# Patient Record
Sex: FEMALE | Race: BLACK OR AFRICAN AMERICAN | Employment: PART TIME | URBAN - METROPOLITAN AREA
[De-identification: names, ages, dates, MRNs, and addresses within clinical notes are randomized per-mention and may not be internally consistent; named-entity substitution may affect disease eponyms.]

---

## 2017-02-12 ENCOUNTER — HOSPITAL ENCOUNTER (EMERGENCY)
Age: 62
Discharge: HOME | End: 2017-02-12
Attending: EMERGENCY MEDICINE
Payer: COMMERCIAL

## 2017-02-12 VITALS
WEIGHT: 233.8 LBS | OXYGEN SATURATION: 100 % | HEART RATE: 89 BPM | TEMPERATURE: 98.4 F | RESPIRATION RATE: 20 BRPM | SYSTOLIC BLOOD PRESSURE: 171 MMHG | BODY MASS INDEX: 34.63 KG/M2 | DIASTOLIC BLOOD PRESSURE: 82 MMHG | HEIGHT: 69 IN

## 2017-02-12 DIAGNOSIS — R10.84 GENERALIZED ABDOMINAL CRAMPS: Primary | ICD-10-CM

## 2017-02-12 PROCEDURE — 74011000250 HC RX REV CODE- 250: Performed by: EMERGENCY MEDICINE

## 2017-02-12 PROCEDURE — 74011250637 HC RX REV CODE- 250/637: Performed by: EMERGENCY MEDICINE

## 2017-02-12 PROCEDURE — 99283 EMERGENCY DEPT VISIT LOW MDM: CPT

## 2017-02-12 RX ADMIN — PHENOBARBITAL ELIXIR 50 ML: 16.2; .1037; .0065; .0194 ELIXIR ORAL at 20:26

## 2017-02-12 NOTE — LETTER
[facility] EMERGENCY DEPT 
[address] [place] [address] 
[phone] Work/School Note Date: 2/12/2017 To Whom It May concern: 
 
Jennie Silva was seen and treated today in the emergency room by the following provider(s): 
Attending Provider: Juan Garcia MD.   
 
Jennie Silva may return to work on 2/13/2017.  
 
Sincerely, 
 
 
 
Juan Garcia MD

## 2017-02-13 NOTE — ED NOTES
Pt presents ambulatory to ED complaining of diffuse abdominal pain x1 hr PTA. Pt denies taking medications for relief of symptoms. Pt denies urinary symptoms, denies N,V,D,  Denies vaginal discharge. Pt also requesting work note during assessment. Pt is alert and oriented x 4, RR even and unlabored, skin is warm and dry. Assesment completed and pt updated on plan of care.

## 2017-02-13 NOTE — DISCHARGE INSTRUCTIONS

## 2017-02-13 NOTE — ED PROVIDER NOTES
HPI Comments: Duran Walter, 63 y.o. Female with PMHx of HTN presents ambulatory to [facility] ED with cc of intermittent lower abdominal cramping and aching x 1 hour. Pt thinks it may be related to something she ate earlier today and reports eating a bowl of fruit today. Her last BM was 1 day ago and she denies a hx of constipation. Pt has had a partial hysterectomy and reports infrequent hot flashes. She has not taken any OTC medications prior to arrival and is requesting a work note for today. She specifically denies any fevers, chills, nausea, vomiting, chest pain, shortness of breath, headache, rash, diarrhea, dysuria, frequency, difficulty urinating, urgency, vaginal discharge, sweating or weight loss. PCP: Bob Roche MD    Social history significant for: - Tobacco (former), + EtOH, - Illicit Drug Use  PSHx: partial hysterectomy. There are no other complaints, changes, or physical findings at this time. Written by PAUL Milneribjess, as dictated by Shaun Kaur MD.      The history is provided by the patient. No  was used. Past Medical History:   Diagnosis Date    Hypercholesterolemia     Hypertension        Past Surgical History:   Procedure Laterality Date    Hx gyn       Hysterectomy         Family History:   Problem Relation Age of Onset    Asthma Mother     Elevated Lipids Sister     Hypertension Sister        Social History     Social History    Marital status: SINGLE     Spouse name: N/A    Number of children: N/A    Years of education: N/A     Occupational History    Not on file.      Social History Main Topics    Smoking status: Former Smoker     Years: 1.00    Smokeless tobacco: Never Used    Alcohol use Yes    Drug use: No    Sexual activity: Yes     Partners: Male     Birth control/ protection: Condom     Other Topics Concern    Not on file     Social History Narrative         ALLERGIES: Tylenol [acetaminophen]    Review of Systems Constitutional: Negative. Negative for chills, diaphoresis and fatigue. (-) malaise   HENT: Negative for sore throat and trouble swallowing. Eyes: Negative. Respiratory: Negative for cough and shortness of breath. (-) COMBS   Cardiovascular: Negative for chest pain. Gastrointestinal: Positive for abdominal pain (lower). Negative for constipation, diarrhea, nausea and vomiting. Endocrine: Negative. Genitourinary: Negative. Negative for difficulty urinating, dysuria, frequency, urgency and vaginal discharge. Musculoskeletal: Negative for arthralgias, back pain and myalgias. Skin: Negative. Allergic/Immunologic: Negative. Neurological: Negative for weakness. Hematological: Does not bruise/bleed easily. All other systems reviewed and are negative. Vitals:    02/12/17 1958   BP: 171/82   Pulse: 89   Resp: 20   Temp: 98.4 °F (36.9 °C)   SpO2: 100%   Weight: 106.1 kg (233 lb 12.8 oz)   Height: 5' 9\" (1.753 m)            Physical Exam   Constitutional: She is oriented to person, place, and time. She appears well-developed and well-nourished. No distress. HENT:   Head: Normocephalic and atraumatic. Mouth/Throat: Oropharynx is clear and moist. No oropharyngeal exudate or posterior oropharyngeal erythema. Neck: Normal range of motion and full passive range of motion without pain. Neck supple. Cardiovascular: Normal rate, regular rhythm, normal heart sounds, intact distal pulses and normal pulses. Exam reveals no gallop and no friction rub. No murmur heard. Pulmonary/Chest: Effort normal and breath sounds normal. No accessory muscle usage. No respiratory distress. She has no decreased breath sounds. She has no wheezes. She has no rhonchi. She has no rales. Abdominal: Soft. Bowel sounds are normal. She exhibits no distension. There is no tenderness. There is no rebound, no guarding and no CVA tenderness. Musculoskeletal: Normal range of motion.  She exhibits no edema or tenderness. Thoracic back: She exhibits no tenderness and no bony tenderness. Lumbar back: She exhibits no tenderness and no bony tenderness. Lymphadenopathy:     She has no cervical adenopathy. Neurological: She is alert and oriented to person, place, and time. She has normal strength. She is not disoriented. No cranial nerve deficit or sensory deficit. No focal deficits; 5/5 muscle strength in all extremities   Skin: Skin is warm. No lesion and no rash noted. Rash is not nodular. She is not diaphoretic. Nursing note and vitals reviewed. MDM  Number of Diagnoses or Management Options  Generalized abdominal cramps:   Diagnosis management comments: DDx: functional abdominal cramps, IBS, constipation, functional abdominal pain        Amount and/or Complexity of Data Reviewed  Review and summarize past medical records: yes    Patient Progress  Patient progress: stable    ED Course       Procedures    MEDICATIONS GIVEN:  Medications   mylanta/donnatal/viscous lidocaine (GI COCKTAIL) (not administered)       IMPRESSION:  1. Generalized abdominal cramps        PLAN:  1. Current Discharge Medication List      CONTINUE these medications which have NOT CHANGED    Details   lisinopril-hydroCHLOROthiazide (PRINZIDE, ZESTORETIC) 20-25 mg per tablet Take 1 Tab by mouth daily. Qty: 90 Tab, Refills: 3      rosuvastatin (CRESTOR) 20 mg tablet Take 1 Tab by mouth nightly. Qty: 90 Tab, Refills: 3           2. Follow-up Information     Follow up With Details Comments Zhang Padilla MD   [address]  [phone]          Return to ED if worse       Discharge Note:  8:11 PM  The pt is ready for discharge. The pt's signs, symptoms, diagnosis, and discharge instructions have been discussed and pt has conveyed their understanding. The pt is to follow up as recommended or return to ER should their symptoms worsen.  Plan has been discussed and pt is in agreement. This note is prepared by Maverick Silva, acting as a Scribe for Niya Delvalle MD.    Niya Delvalle MD: The scribe's documentation has been prepared under my direction and personally reviewed by me in its entirety. I confirm that the notes above accurately reflects all work, treatment, procedures, and medical decision making performed by me.

## 2017-02-13 NOTE — ED NOTES
Discharge instructions were given to the patient by Blanca Silva RN. The patient left the Emergency Department ambulatory, alert and oriented and in no acute distress with 0 prescriptions and a note. The patient was encouraged to call or return to the ED for worsening issues or problems and was encouraged to schedule a follow up appointment for continuing care. The patient verbalized understanding of discharge instructions and prescriptions, all questions were answered. The patient has no further concerns at this time.

## 2017-03-07 NOTE — TELEPHONE ENCOUNTER
Patient states she needs a call back in reference to her Lisinopril prescribed & getting headaches within 30 mins of taking medication & side effects. Patient states she would like to discuss alternatives. Please call.  Thank you

## 2017-03-07 NOTE — TELEPHONE ENCOUNTER
Call completed, two patient identifiers verified. Patient reports experiencing headaches after taking lisinopril. Patient dc'd medication and the headaches stopped. She reports previously taking Hctz with NO side effects.

## 2017-03-08 NOTE — TELEPHONE ENCOUNTER
Patient states she needs a call back today in reference to previous encounter & discussion with nurse yesterday on her medication & needs to be advise further what Dr. Gayl Sicard of care is. Patient states a detailed message can be left on voice mail of attached phone number. Please call to advise.  Thank you

## 2017-03-09 RX ORDER — HYDROCHLOROTHIAZIDE 12.5 MG/1
12.5 TABLET ORAL DAILY
Qty: 90 TAB | Refills: 0 | Status: SHIPPED | OUTPATIENT
Start: 2017-03-09 | End: 2017-08-17

## 2017-03-09 RX ORDER — LOSARTAN POTASSIUM 25 MG/1
25 TABLET ORAL DAILY
Qty: 30 TAB | Refills: 2 | Status: SHIPPED | OUTPATIENT
Start: 2017-03-09 | End: 2017-12-01

## 2017-03-09 NOTE — TELEPHONE ENCOUNTER
Call completed to patient, two identifiers verified. Patient advised that Rx's are available at her pharmacy.

## 2017-03-09 NOTE — TELEPHONE ENCOUNTER
She can continue to take hctz for now. I will add losartan to replace lisinopril. Continue to check blood pressures. Bp check with nurse in two weeks. Will need an earlier f/u if cont. problems with medication.

## 2017-03-09 NOTE — TELEPHONE ENCOUNTER
Patient states she needs a call back today to be advised 's Plan of Care from Previous encounters for her medication problem & possible change of medication. Please call to advise.  Thank you

## 2017-07-15 ENCOUNTER — HOSPITAL ENCOUNTER (EMERGENCY)
Age: 62
Discharge: STILL ADMITTED OR ADMINISTRATIVE | End: 2017-07-15
Attending: FAMILY MEDICINE

## 2017-07-15 DIAGNOSIS — Z53.21 PATIENT LEFT BEFORE EVALUATION BY PHYSICIAN: Primary | ICD-10-CM

## 2017-07-16 NOTE — UC PROVIDER NOTE
HPI     Past Medical History:   Diagnosis Date    Hypercholesterolemia     Hypertension         Past Surgical History:   Procedure Laterality Date    HX GYN      Hysterectomy         Family History   Problem Relation Age of Onset    Asthma Mother     Elevated Lipids Sister     Hypertension Sister         Social History     Social History    Marital status: SINGLE     Spouse name: N/A    Number of children: N/A    Years of education: N/A     Occupational History    Not on file. Social History Main Topics    Smoking status: Former Smoker     Years: 1.00    Smokeless tobacco: Never Used    Alcohol use Yes    Drug use: No    Sexual activity: Yes     Partners: Male     Birth control/ protection: Condom     Other Topics Concern    Not on file     Social History Narrative                ALLERGIES: Tylenol [acetaminophen]    Review of Systems    There were no vitals filed for this visit.     Physical Exam    MDM     Differential Diagnosis; Clinical Impression; Plan:     patient not seen      Procedures

## 2017-08-17 ENCOUNTER — APPOINTMENT (OUTPATIENT)
Dept: GENERAL RADIOLOGY | Age: 62
End: 2017-08-17
Attending: PHYSICIAN ASSISTANT
Payer: COMMERCIAL

## 2017-08-17 ENCOUNTER — HOSPITAL ENCOUNTER (EMERGENCY)
Age: 62
Discharge: HOME | End: 2017-08-17
Attending: EMERGENCY MEDICINE
Payer: COMMERCIAL

## 2017-08-17 ENCOUNTER — APPOINTMENT (OUTPATIENT)
Dept: CT IMAGING | Age: 62
End: 2017-08-17
Attending: PHYSICIAN ASSISTANT
Payer: COMMERCIAL

## 2017-08-17 VITALS
TEMPERATURE: 98.4 F | SYSTOLIC BLOOD PRESSURE: 160 MMHG | RESPIRATION RATE: 16 BRPM | DIASTOLIC BLOOD PRESSURE: 90 MMHG | OXYGEN SATURATION: 98 % | BODY MASS INDEX: 31.7 KG/M2 | WEIGHT: 214 LBS | HEIGHT: 69 IN | HEART RATE: 89 BPM

## 2017-08-17 DIAGNOSIS — R42 VERTIGO: Primary | ICD-10-CM

## 2017-08-17 LAB
AMPHET UR QL SCN: NEGATIVE
ANION GAP SERPL CALC-SCNC: 9 MMOL/L (ref 5–15)
BARBITURATES UR QL SCN: NEGATIVE
BASOPHILS # BLD: 0 K/UL (ref 0–0.1)
BASOPHILS NFR BLD: 0 % (ref 0–1)
BENZODIAZ UR QL: NEGATIVE
BNP SERPL-MCNC: 46 PG/ML (ref 0–125)
BUN SERPL-MCNC: 7 MG/DL (ref 6–20)
BUN/CREAT SERPL: 9 (ref 12–20)
CALCIUM SERPL-MCNC: 9.9 MG/DL (ref 8.5–10.1)
CANNABINOIDS UR QL SCN: NEGATIVE
CHLORIDE SERPL-SCNC: 104 MMOL/L (ref 97–108)
CK MB CFR SERPL CALC: 0.8 % (ref 0–2.5)
CK MB SERPL-MCNC: 1.8 NG/ML (ref 5–25)
CK SERPL-CCNC: 227 U/L (ref 26–192)
CO2 SERPL-SCNC: 28 MMOL/L (ref 21–32)
COCAINE UR QL SCN: NEGATIVE
CREAT SERPL-MCNC: 0.82 MG/DL (ref 0.55–1.02)
DRUG SCRN COMMENT,DRGCM: NORMAL
EOSINOPHIL # BLD: 0 K/UL (ref 0–0.4)
EOSINOPHIL NFR BLD: 1 % (ref 0–7)
ERYTHROCYTE [DISTWIDTH] IN BLOOD BY AUTOMATED COUNT: 14.7 % (ref 11.5–14.5)
GLUCOSE BLD STRIP.AUTO-MCNC: 119 MG/DL (ref 65–100)
GLUCOSE SERPL-MCNC: 116 MG/DL (ref 65–100)
HCT VFR BLD AUTO: 43.4 % (ref 35–47)
HGB BLD-MCNC: 14.7 G/DL (ref 11.5–16)
LYMPHOCYTES # BLD: 1.9 K/UL (ref 0.8–3.5)
LYMPHOCYTES NFR BLD: 41 % (ref 12–49)
MCH RBC QN AUTO: 30.2 PG (ref 26–34)
MCHC RBC AUTO-ENTMCNC: 33.9 G/DL (ref 30–36.5)
MCV RBC AUTO: 89.3 FL (ref 80–99)
METHADONE UR QL: NEGATIVE
MONOCYTES # BLD: 0.3 K/UL (ref 0–1)
MONOCYTES NFR BLD: 6 % (ref 5–13)
NEUTS SEG # BLD: 2.5 K/UL (ref 1.8–8)
NEUTS SEG NFR BLD: 52 % (ref 32–75)
OPIATES UR QL: NEGATIVE
PCP UR QL: NEGATIVE
PLATELET # BLD AUTO: 169 K/UL (ref 150–400)
POTASSIUM SERPL-SCNC: 4.2 MMOL/L (ref 3.5–5.1)
RBC # BLD AUTO: 4.86 M/UL (ref 3.8–5.2)
SERVICE CMNT-IMP: ABNORMAL
SODIUM SERPL-SCNC: 141 MMOL/L (ref 136–145)
TROPONIN I SERPL-MCNC: <0.04 NG/ML
WBC # BLD AUTO: 4.8 K/UL (ref 3.6–11)

## 2017-08-17 PROCEDURE — 85025 COMPLETE CBC W/AUTO DIFF WBC: CPT | Performed by: PHYSICIAN ASSISTANT

## 2017-08-17 PROCEDURE — 71020 XR CHEST PA LAT: CPT

## 2017-08-17 PROCEDURE — 74011250637 HC RX REV CODE- 250/637: Performed by: PHYSICIAN ASSISTANT

## 2017-08-17 PROCEDURE — 99284 EMERGENCY DEPT VISIT MOD MDM: CPT

## 2017-08-17 PROCEDURE — 93005 ELECTROCARDIOGRAM TRACING: CPT

## 2017-08-17 PROCEDURE — 84484 ASSAY OF TROPONIN QUANT: CPT | Performed by: PHYSICIAN ASSISTANT

## 2017-08-17 PROCEDURE — 36415 COLL VENOUS BLD VENIPUNCTURE: CPT | Performed by: PHYSICIAN ASSISTANT

## 2017-08-17 PROCEDURE — 83880 ASSAY OF NATRIURETIC PEPTIDE: CPT | Performed by: PHYSICIAN ASSISTANT

## 2017-08-17 PROCEDURE — 70450 CT HEAD/BRAIN W/O DYE: CPT

## 2017-08-17 PROCEDURE — 82550 ASSAY OF CK (CPK): CPT | Performed by: PHYSICIAN ASSISTANT

## 2017-08-17 PROCEDURE — 96374 THER/PROPH/DIAG INJ IV PUSH: CPT

## 2017-08-17 PROCEDURE — 80307 DRUG TEST PRSMV CHEM ANLYZR: CPT | Performed by: PHYSICIAN ASSISTANT

## 2017-08-17 PROCEDURE — 80048 BASIC METABOLIC PNL TOTAL CA: CPT | Performed by: PHYSICIAN ASSISTANT

## 2017-08-17 PROCEDURE — 74011250636 HC RX REV CODE- 250/636: Performed by: PHYSICIAN ASSISTANT

## 2017-08-17 PROCEDURE — 82962 GLUCOSE BLOOD TEST: CPT

## 2017-08-17 RX ORDER — MECLIZINE HYDROCHLORIDE 25 MG/1
25 TABLET ORAL
Qty: 20 TAB | Refills: 0 | Status: SHIPPED | OUTPATIENT
Start: 2017-08-17

## 2017-08-17 RX ORDER — ONDANSETRON 4 MG/1
4 TABLET, ORALLY DISINTEGRATING ORAL
Status: COMPLETED | OUTPATIENT
Start: 2017-08-17 | End: 2017-08-17

## 2017-08-17 RX ORDER — SODIUM CHLORIDE 0.9 % (FLUSH) 0.9 %
5-10 SYRINGE (ML) INJECTION AS NEEDED
Status: DISCONTINUED | OUTPATIENT
Start: 2017-08-17 | End: 2017-08-17 | Stop reason: HOSPADM

## 2017-08-17 RX ORDER — KETOROLAC TROMETHAMINE 30 MG/ML
30 INJECTION, SOLUTION INTRAMUSCULAR; INTRAVENOUS
Status: COMPLETED | OUTPATIENT
Start: 2017-08-17 | End: 2017-08-17

## 2017-08-17 RX ORDER — HYDROCHLOROTHIAZIDE 12.5 MG/1
12.5 TABLET ORAL DAILY
Qty: 10 TAB | Refills: 0 | Status: SHIPPED | OUTPATIENT
Start: 2017-08-17 | End: 2017-12-01

## 2017-08-17 RX ORDER — SODIUM CHLORIDE 0.9 % (FLUSH) 0.9 %
5-10 SYRINGE (ML) INJECTION EVERY 8 HOURS
Status: DISCONTINUED | OUTPATIENT
Start: 2017-08-17 | End: 2017-08-17 | Stop reason: HOSPADM

## 2017-08-17 RX ADMIN — KETOROLAC TROMETHAMINE 30 MG: 30 INJECTION, SOLUTION INTRAMUSCULAR at 13:14

## 2017-08-17 RX ADMIN — ONDANSETRON 4 MG: 4 TABLET, ORALLY DISINTEGRATING ORAL at 11:16

## 2017-08-17 NOTE — ED PROVIDER NOTES
HPI Comments: Pt presents with PMHx of htn and hypercholesterolemia. Pt reports dizziness with assoc frontal headache, N/V that started this am. Denies chest pain, SOB, blurred vision, head trauma/injury. Pt denies cardiac hx. Denies hx migraine headaches. Denies weakness, numbness/tingling, facial droop, slurred speech, gait abnormality, abd pain, change in BM, urinary sx. Patient is a 63 y.o. female presenting with dizziness. The history is provided by the patient. Dizziness   This is a new problem. Episode onset: x 1 morning. There was no focality noted. There has been no fever. Associated symptoms include vomiting, headaches and nausea. Pertinent negatives include no shortness of breath, no chest pain, no confusion, no choking, no bowel incontinence and no bladder incontinence. Past Medical History:   Diagnosis Date    Hypercholesterolemia     Hypertension        Past Surgical History:   Procedure Laterality Date    HX GYN      Hysterectomy         Family History:   Problem Relation Age of Onset    Asthma Mother     Elevated Lipids Sister     Hypertension Sister        Social History     Social History    Marital status: SINGLE     Spouse name: N/A    Number of children: N/A    Years of education: N/A     Occupational History    Not on file. Social History Main Topics    Smoking status: Former Smoker     Years: 1.00    Smokeless tobacco: Never Used    Alcohol use Yes    Drug use: No    Sexual activity: Yes     Partners: Male     Birth control/ protection: Condom     Other Topics Concern    Not on file     Social History Narrative         ALLERGIES: Tylenol [acetaminophen]    Review of Systems   Constitutional: Negative for chills and fever. HENT: Negative for facial swelling and trouble swallowing. Eyes: Negative for photophobia and visual disturbance. Respiratory: Negative for choking and shortness of breath. Cardiovascular: Negative for chest pain.    Gastrointestinal: Positive for nausea and vomiting. Negative for abdominal distention, abdominal pain, bowel incontinence, constipation and diarrhea. Genitourinary: Negative for bladder incontinence and flank pain. Musculoskeletal: Negative for back pain and myalgias. Skin: Negative for color change, pallor, rash and wound. Neurological: Positive for dizziness and headaches. Negative for syncope, facial asymmetry, weakness, light-headedness and numbness. Psychiatric/Behavioral: Negative for confusion. All other systems reviewed and are negative. Vitals:    08/17/17 1044   BP: 160/90   Pulse: 89   Resp: 16   Temp: 98.4 °F (36.9 °C)   SpO2: 98%   Weight: 97.1 kg (214 lb)   Height: 5' 9\" (1.753 m)            Physical Exam   Constitutional: She is oriented to person, place, and time. She appears well-developed and well-nourished. No distress. HENT:   Head: Normocephalic and atraumatic. Eyes: Conjunctivae are normal.   Cardiovascular: Normal rate, regular rhythm and normal heart sounds. Pulmonary/Chest: Effort normal and breath sounds normal. No respiratory distress. She has no wheezes. She has no rales. Abdominal: Soft. Bowel sounds are normal. She exhibits no distension. There is no tenderness. Musculoskeletal: Normal range of motion. Neurological: She is alert and oriented to person, place, and time. She has normal strength. No cranial nerve deficit. She displays a negative Romberg sign. GCS eye subscore is 4. GCS verbal subscore is 5. GCS motor subscore is 6. No facial droop  No focal weakness  Strength 5/5 and equal in all extremities b/l  Sensation intact  No gait abnormality  Speech clear   Skin: Skin is warm. No rash noted. No erythema. Psychiatric: She has a normal mood and affect. Her behavior is normal.   Nursing note and vitals reviewed.        MDM  Number of Diagnoses or Management Options  Vertigo:   Diagnosis management comments: DDx: Angina, MI, Stroke, TIA, CHF, Pneumonia, Headache    ED Course       Procedures           ED EKG interpretation:  Rhythm: normal sinus rhythm; and regular . Rate (approx.): 78; Axis: normal; P wave: normal; QRS interval: normal ; ST/T wave: T wave inverted; in  Lead: V3, V4, V5 and V6; Other findings: abnormal ekg. This EKG was interpreted by SCOTTY Mcqueen,ED Provider.             LABORATORY TESTS:  Recent Results (from the past 12 hour(s))   GLUCOSE, POC    Collection Time: 08/17/17 10:49 AM   Result Value Ref Range    Glucose (POC) 119 (H) 65 - 100 mg/dL    Performed by Luci Frost    EKG, 12 LEAD, INITIAL    Collection Time: 08/17/17 10:52 AM   Result Value Ref Range    Ventricular Rate 78 BPM    Atrial Rate 78 BPM    P-R Interval 152 ms    QRS Duration 76 ms    Q-T Interval 374 ms    QTC Calculation (Bezet) 426 ms    Calculated P Axis 70 degrees    Calculated R Axis 24 degrees    Diagnosis       Normal sinus rhythm  T wave abnormality, consider anterior ischemia  Abnormal ECG  When compared with ECG of 24-OCT-2016 16:29,  T wave inversion now evident in Anterior leads     CK W/ CKMB & INDEX    Collection Time: 08/17/17 11:15 AM   Result Value Ref Range     (H) 26 - 192 U/L    CK - MB 1.8 <3.6 NG/ML    CK-MB Index 0.8 0 - 2.5     TROPONIN I    Collection Time: 08/17/17 11:15 AM   Result Value Ref Range    Troponin-I, Qt. <0.04 <0.05 ng/mL   DRUG SCREEN, URINE    Collection Time: 08/17/17 11:15 AM   Result Value Ref Range    AMPHETAMINES NEGATIVE  NEG      BARBITURATES NEGATIVE  NEG      BENZODIAZEPINE NEGATIVE  NEG      COCAINE NEGATIVE  NEG      METHADONE NEGATIVE  NEG      OPIATES NEGATIVE  NEG      PCP(PHENCYCLIDINE) NEGATIVE  NEG      THC (TH-CANNABINOL) NEGATIVE  NEG      Drug screen comment (NOTE)    CBC WITH AUTOMATED DIFF    Collection Time: 08/17/17 11:15 AM   Result Value Ref Range    WBC 4.8 3.6 - 11.0 K/uL    RBC 4.86 3.80 - 5.20 M/uL    HGB 14.7 11.5 - 16.0 g/dL    HCT 43.4 35.0 - 47.0 %    MCV 89.3 80.0 - 99.0 FL    MCH 30.2 26.0 - 34.0 PG MCHC 33.9 30.0 - 36.5 g/dL    RDW 14.7 (H) 11.5 - 14.5 %    PLATELET 637 958 - 203 K/uL    NEUTROPHILS 52 32 - 75 %    LYMPHOCYTES 41 12 - 49 %    MONOCYTES 6 5 - 13 %    EOSINOPHILS 1 0 - 7 %    BASOPHILS 0 0 - 1 %    ABS. NEUTROPHILS 2.5 1.8 - 8.0 K/UL    ABS. LYMPHOCYTES 1.9 0.8 - 3.5 K/UL    ABS. MONOCYTES 0.3 0.0 - 1.0 K/UL    ABS. EOSINOPHILS 0.0 0.0 - 0.4 K/UL    ABS. BASOPHILS 0.0 0.0 - 0.1 K/UL   NT-PRO BNP    Collection Time: 08/17/17 11:15 AM   Result Value Ref Range    NT pro-BNP 46 0 - 155 PG/ML   METABOLIC PANEL, BASIC    Collection Time: 08/17/17 11:15 AM   Result Value Ref Range    Sodium 141 136 - 145 mmol/L    Potassium 4.2 3.5 - 5.1 mmol/L    Chloride 104 97 - 108 mmol/L    CO2 28 21 - 32 mmol/L    Anion gap 9 5 - 15 mmol/L    Glucose 116 (H) 65 - 100 mg/dL    BUN 7 6 - 20 MG/DL    Creatinine 0.82 0.55 - 1.02 MG/DL    BUN/Creatinine ratio 9 (L) 12 - 20      GFR est AA >60 >60 ml/min/1.73m2    GFR est non-AA >60 >60 ml/min/1.73m2    Calcium 9.9 8.5 - 10.1 MG/DL       IMAGING RESULTS:  CT HEAD WO CONT   Final Result      XR CHEST PA LAT   Final Result          MEDICATIONS GIVEN:  Medications   ondansetron (ZOFRAN ODT) tablet 4 mg (4 mg Oral Given 8/17/17 1116)   ketorolac (TORADOL) injection 30 mg (30 mg IntraVENous Given 8/17/17 1314)       IMPRESSION:  1. Vertigo        PLAN:  1. Discharge Medication List as of 8/17/2017  1:06 PM      START taking these medications    Details   meclizine (ANTIVERT) 25 mg tablet Take 1 Tab by mouth every six (6) hours as needed., Normal, Disp-20 Tab, R-0         CONTINUE these medications which have CHANGED    Details   hydroCHLOROthiazide (HYDRODIURIL) 12.5 mg tablet Take 1 Tab by mouth daily. , Normal, Disp-10 Tab, R-0         CONTINUE these medications which have NOT CHANGED    Details   losartan (COZAAR) 25 mg tablet Take 1 Tab by mouth daily.  Indications: hypertension, Normal, Disp-30 Tab, R-2      rosuvastatin (CRESTOR) 20 mg tablet Take 1 Tab by mouth nightly., Normal, Disp-90 Tab, R-3           2.    Follow-up Information     Follow up With Details Comments Contact Vicki Mejia MD Schedule an appointment as soon as possible for a visit in 1 day for PCP follow up [address] [facility]  [phone]      [facility] EMERGENCY DEPT  If symptoms worsen Topher Geraldo  [phone]        Return to ED if worse

## 2017-08-17 NOTE — ED TRIAGE NOTES
C/o intermittent dizziness, n/v, headache since waking up this AM; denies recent injury/trauma/diarrhea/abd pain

## 2017-08-17 NOTE — LETTER
[facility] EMERGENCY DEPT 
[facility] [facility] [address] [address] [phone] Work/School Note Date: 8/17/2017 To Whom It May concern: 
 
Delia Castillo was seen and treated today in the emergency room by the following provider(s): 
Attending Provider: Alfredito Martínez MD 
Physician Assistant: Tre Naidu. Delia Castillo may return to work on 8/20/2017. Sincerely, SCOTTY Naidu

## 2017-08-17 NOTE — LETTER
[facility] EMERGENCY DEPT 
[address] [facility] [address] [address] 
[phone] Work/School Note Date: 8/17/2017 To Whom It May concern: 
 
Jameson Colbert was seen and treated today in the emergency room by the following provider(s): 
Attending Provider: Robert Orellana MD 
Physician Assistant: James Beltre. Jameson Colbert may return to work on 8/21/2017. Sincerely, SCOTTY Beltre

## 2017-08-22 LAB
ATRIAL RATE: 78 BPM
CALCULATED P AXIS, ECG09: 70 DEGREES
CALCULATED R AXIS, ECG10: 24 DEGREES
DIAGNOSIS, 93000: NORMAL
P-R INTERVAL, ECG05: 152 MS
Q-T INTERVAL, ECG07: 374 MS
QRS DURATION, ECG06: 76 MS
QTC CALCULATION (BEZET), ECG08: 426 MS
VENTRICULAR RATE, ECG03: 78 BPM

## 2017-12-01 ENCOUNTER — APPOINTMENT (OUTPATIENT)
Dept: GENERAL RADIOLOGY | Age: 62
End: 2017-12-01
Attending: PHYSICIAN ASSISTANT
Payer: COMMERCIAL

## 2017-12-01 ENCOUNTER — HOSPITAL ENCOUNTER (EMERGENCY)
Age: 62
Discharge: HOME | End: 2017-12-01
Attending: EMERGENCY MEDICINE
Payer: COMMERCIAL

## 2017-12-01 VITALS
BODY MASS INDEX: 35.55 KG/M2 | HEIGHT: 69 IN | TEMPERATURE: 98.2 F | OXYGEN SATURATION: 97 % | WEIGHT: 240 LBS | RESPIRATION RATE: 18 BRPM | HEART RATE: 78 BPM | DIASTOLIC BLOOD PRESSURE: 102 MMHG | SYSTOLIC BLOOD PRESSURE: 185 MMHG

## 2017-12-01 DIAGNOSIS — J06.9 ACUTE UPPER RESPIRATORY INFECTION: Primary | ICD-10-CM

## 2017-12-01 DIAGNOSIS — I10 ESSENTIAL HYPERTENSION: ICD-10-CM

## 2017-12-01 PROCEDURE — 99283 EMERGENCY DEPT VISIT LOW MDM: CPT

## 2017-12-01 PROCEDURE — 74011250637 HC RX REV CODE- 250/637: Performed by: PHYSICIAN ASSISTANT

## 2017-12-01 PROCEDURE — 71020 XR CHEST PA LAT: CPT

## 2017-12-01 RX ORDER — HYDROCHLOROTHIAZIDE 25 MG/1
12.5 TABLET ORAL
Status: COMPLETED | OUTPATIENT
Start: 2017-12-01 | End: 2017-12-01

## 2017-12-01 RX ORDER — HYDROCHLOROTHIAZIDE 12.5 MG/1
12.5 TABLET ORAL DAILY
Qty: 10 TAB | Refills: 0 | Status: SHIPPED | OUTPATIENT
Start: 2017-12-01 | End: 2021-02-01

## 2017-12-01 RX ORDER — LOSARTAN POTASSIUM 25 MG/1
25 TABLET ORAL
Status: COMPLETED | OUTPATIENT
Start: 2017-12-01 | End: 2017-12-01

## 2017-12-01 RX ORDER — LOSARTAN POTASSIUM 25 MG/1
25 TABLET ORAL DAILY
Qty: 10 TAB | Refills: 2 | Status: SHIPPED | OUTPATIENT
Start: 2017-12-01 | End: 2021-02-01

## 2017-12-01 RX ADMIN — HYDROCHLOROTHIAZIDE 12.5 MG: 25 TABLET ORAL at 18:26

## 2017-12-01 RX ADMIN — LOSARTAN POTASSIUM 25 MG: 25 TABLET, FILM COATED ORAL at 18:28

## 2017-12-01 NOTE — LETTER
[facility] EMERGENCY DEPT 
[address] [facility] [id number] [phone] Work/School Note Date: 12/1/2017 To Whom It May concern: 
 
Chaim Hopper was seen and treated today in the emergency room by the following provider(s): 
Attending Provider: Aide Isabel MD 
Physician Assistant: Tre Maddox. Chaim Hopper may return to work on 12/4/2017. Sincerely, SCOTTY Maddox

## 2017-12-01 NOTE — DISCHARGE INSTRUCTIONS
Upper Respiratory Infection (Cold): Care Instructions  Your Care Instructions    An upper respiratory infection, or URI, is an infection of the nose, sinuses, or throat. URIs are spread by coughs, sneezes, and direct contact. The common cold is the most frequent kind of URI. The flu and sinus infections are other kinds of URIs. Almost all URIs are caused by viruses. Antibiotics won't cure them. But you can treat most infections with home care. This may include drinking lots of fluids and taking over-the-counter pain medicine. You will probably feel better in 4 to 10 days. The doctor has checked you carefully, but problems can develop later. If you notice any problems or new symptoms, get medical treatment right away. Follow-up care is a key part of your treatment and safety. Be sure to make and go to all appointments, and call your doctor if you are having problems. It's also a good idea to know your test results and keep a list of the medicines you take. How can you care for yourself at home? · To prevent dehydration, drink plenty of fluids, enough so that your urine is light yellow or clear like water. Choose water and other caffeine-free clear liquids until you feel better. If you have kidney, heart, or liver disease and have to limit fluids, talk with your doctor before you increase the amount of fluids you drink. · Take an over-the-counter pain medicine, such as acetaminophen (Tylenol), ibuprofen (Advil, Motrin), or naproxen (Aleve). Read and follow all instructions on the label. · Before you use cough and cold medicines, check the label. These medicines may not be safe for young children or for people with certain health problems. · Be careful when taking over-the-counter cold or flu medicines and Tylenol at the same time. Many of these medicines have acetaminophen, which is Tylenol. Read the labels to make sure that you are not taking more than the recommended dose.  Too much acetaminophen (Tylenol) can be harmful. · Get plenty of rest.  · Do not smoke or allow others to smoke around you. If you need help quitting, talk to your doctor about stop-smoking programs and medicines. These can increase your chances of quitting for good. When should you call for help? Call 911 anytime you think you may need emergency care. For example, call if:  ? · You have severe trouble breathing. ?Call your doctor now or seek immediate medical care if:  ? · You seem to be getting much sicker. ? · You have new or worse trouble breathing. ? · You have a new or higher fever. ? · You have a new rash. ? Watch closely for changes in your health, and be sure to contact your doctor if:  ? · You have a new symptom, such as a sore throat, an earache, or sinus pain. ? · You cough more deeply or more often, especially if you notice more mucus or a change in the color of your mucus. ? · You do not get better as expected. Where can you learn more? Go to http://flory-flaquito.info/. Enter W178 in the search box to learn more about \"Upper Respiratory Infection (Cold): Care Instructions. \"  Current as of: May 12, 2017  Content Version: 11.4  © 0116-7774 [employer]. Care instructions adapted under license by [employer] (which disclaims liability or warranty for this information). If you have questions about a medical condition or this instruction, always ask your healthcare professional. [place] [address] any warranty or liability for your use of this information.

## 2017-12-01 NOTE — ED NOTES
Pt medicated as per provider orders. Pt accepted DC data and med's. Pt decline  WC for DC. Patient (s)  given copy of dc instructions and 1 script(s). Patient (s)  verbalized understanding of instructions and script (s). Patient given a current medication reconciliation form and verbalized understanding of their medications. Patient (s) verbalized understanding of the importance of discussing medications with  his or her physician or clinic they will be following up with. Patient alert and oriented and in no acute distress. Patient discharged home ambulatory with self.

## 2017-12-01 NOTE — ED PROVIDER NOTES
Patient is a 63 y.o. female presenting with cough. The history is provided by the patient. Cough   This is a new problem. Episode onset: Pt reports productive cough, sore throat and mylagias x 4 days. Denies fever/chills, ear pain. The cough is productive of sputum. There has been no fever. Associated symptoms include sore throat. Pertinent negatives include no chest pain, no chills, no sweats, no eye redness, no ear congestion, no ear pain, no headaches, no rhinorrhea, no myalgias, no shortness of breath, no wheezing, no nausea and no vomiting. She has tried nothing for the symptoms. She is not a smoker. Her past medical history does not include asthma. Past Medical History:   Diagnosis Date    Hypercholesterolemia     Hypertension        Past Surgical History:   Procedure Laterality Date    HX GYN      Hysterectomy         Family History:   Problem Relation Age of Onset    Asthma Mother     Elevated Lipids Sister     Hypertension Sister        Social History     Social History    Marital status: SINGLE     Spouse name: N/A    Number of children: N/A    Years of education: N/A     Occupational History    Not on file. Social History Main Topics    Smoking status: Former Smoker     Years: 1.00    Smokeless tobacco: Never Used    Alcohol use Yes    Drug use: No    Sexual activity: Yes     Partners: Male     Birth control/ protection: Condom     Other Topics Concern    Not on file     Social History Narrative         ALLERGIES: Tylenol [acetaminophen]    Review of Systems   Constitutional: Negative for chills and fever. HENT: Positive for congestion and sore throat. Negative for ear pain, rhinorrhea, sinus pressure and trouble swallowing. Eyes: Negative for redness. Respiratory: Positive for cough. Negative for chest tightness, shortness of breath and wheezing. Cardiovascular: Negative for chest pain. Gastrointestinal: Negative for abdominal pain, nausea and vomiting. Genitourinary: Negative for flank pain. Musculoskeletal: Negative for back pain and myalgias. Skin: Negative for color change, pallor, rash and wound. Neurological: Negative for dizziness, weakness, light-headedness and headaches. All other systems reviewed and are negative. Vitals:    12/01/17 1613 12/01/17 1615   BP: (!) 204/105 (!) 181/106   Pulse: 75    Resp: 18    Temp: 98.2 °F (36.8 °C)    SpO2: 97%    Weight: 108.9 kg (240 lb)    Height: 5' 9\" (1.753 m)             Physical Exam   Constitutional: She is oriented to person, place, and time. She appears well-developed and well-nourished. No distress. HENT:   Head: Normocephalic and atraumatic. Right Ear: Tympanic membrane, external ear and ear canal normal.   Left Ear: Tympanic membrane, external ear and ear canal normal.   Nose: Mucosal edema present. Right sinus exhibits no maxillary sinus tenderness and no frontal sinus tenderness. Left sinus exhibits no maxillary sinus tenderness and no frontal sinus tenderness. Mouth/Throat: Uvula is midline, oropharynx is clear and moist and mucous membranes are normal. No oropharyngeal exudate, posterior oropharyngeal edema, posterior oropharyngeal erythema or tonsillar abscesses. Eyes: Conjunctivae are normal.   Cardiovascular: Normal rate, regular rhythm and normal heart sounds. Pulmonary/Chest: Effort normal and breath sounds normal. No respiratory distress. She has no wheezes. She has no rales. Abdominal: Soft. Bowel sounds are normal. She exhibits no distension. There is no tenderness. Musculoskeletal: Normal range of motion. Neurological: She is alert and oriented to person, place, and time. Skin: Skin is warm. No erythema. Psychiatric: She has a normal mood and affect. Her behavior is normal.   Nursing note and vitals reviewed.        MDM  Number of Diagnoses or Management Options  Acute upper respiratory infection:   Diagnosis management comments: DDx: URI, Pneumonia, Bronchitis    ED Course       Procedures        Pt requested CXR. Discussed dx and treatment plan with pt. Pt continues to deny chest pain, SOB and dizziness. Discussed importance of PCP f/u for htn. Return if experience above sx. LABORATORY TESTS:  No results found for this or any previous visit (from the past 12 hour(s)). IMAGING RESULTS:  XR CHEST PA LAT   Final Result          MEDICATIONS GIVEN:  Medications   losartan (COZAAR) tablet 25 mg (25 mg Oral Given 12/1/17 1828)   hydroCHLOROthiazide (HYDRODIURIL) tablet 12.5 mg (12.5 mg Oral Given 12/1/17 1826)       IMPRESSION:  1. Acute upper respiratory infection    2. Essential hypertension        PLAN:  1. Current Discharge Medication List      START taking these medications    Details   guaiFENesin-dextromethorphan SR (MUCINEX DM) 600-30 mg per tablet Take 1 Tab by mouth two (2) times a day. Qty: 20 Tab, Refills: 0         CONTINUE these medications which have CHANGED    Details   losartan (COZAAR) 25 mg tablet Take 1 Tab by mouth daily. Indications: hypertension  Qty: 10 Tab, Refills: 2      hydroCHLOROthiazide (HYDRODIURIL) 12.5 mg tablet Take 1 Tab by mouth daily. Qty: 10 Tab, Refills: 0           2.    Follow-up Information     Follow up With Details Comments Av. Edy Ellis MD Schedule an appointment as soon as possible for a visit in 2 days for PCP f/u [facility]  [phone]          Return to ED if worse

## 2020-09-04 ENCOUNTER — HOSPITAL ENCOUNTER (EMERGENCY)
Age: 65
Discharge: HOME | End: 2020-09-04
Attending: EMERGENCY MEDICINE | Admitting: EMERGENCY MEDICINE

## 2020-09-04 ENCOUNTER — APPOINTMENT (OUTPATIENT)
Dept: GENERAL RADIOLOGY | Age: 65
End: 2020-09-04
Attending: EMERGENCY MEDICINE

## 2020-09-04 VITALS
DIASTOLIC BLOOD PRESSURE: 70 MMHG | HEART RATE: 80 BPM | HEIGHT: 69 IN | RESPIRATION RATE: 18 BRPM | WEIGHT: 220 LBS | TEMPERATURE: 98.3 F | OXYGEN SATURATION: 100 % | BODY MASS INDEX: 32.58 KG/M2 | SYSTOLIC BLOOD PRESSURE: 128 MMHG

## 2020-09-04 DIAGNOSIS — J18.9 COMMUNITY ACQUIRED PNEUMONIA, UNSPECIFIED LATERALITY: Primary | ICD-10-CM

## 2020-09-04 DIAGNOSIS — Z20.822 SUSPECTED COVID-19 VIRUS INFECTION: ICD-10-CM

## 2020-09-04 DIAGNOSIS — Z71.89 EDUCATED ABOUT COVID-19 VIRUS INFECTION: ICD-10-CM

## 2020-09-04 PROCEDURE — 71045 X-RAY EXAM CHEST 1 VIEW: CPT

## 2020-09-04 PROCEDURE — 74011250637 HC RX REV CODE- 250/637: Performed by: EMERGENCY MEDICINE

## 2020-09-04 PROCEDURE — 99284 EMERGENCY DEPT VISIT MOD MDM: CPT

## 2020-09-04 RX ORDER — ASCORBIC ACID 500 MG
500 TABLET ORAL 2 TIMES DAILY
Qty: 10 TAB | Refills: 0 | Status: SHIPPED | OUTPATIENT
Start: 2020-09-04 | End: 2020-09-09

## 2020-09-04 RX ORDER — AZITHROMYCIN 500 MG/1
500 TABLET, FILM COATED ORAL
Status: COMPLETED | OUTPATIENT
Start: 2020-09-04 | End: 2020-09-04

## 2020-09-04 RX ORDER — ALBUTEROL SULFATE 90 UG/1
2 AEROSOL, METERED RESPIRATORY (INHALATION)
Qty: 1 INHALER | Refills: 0 | Status: SHIPPED | OUTPATIENT
Start: 2020-09-04 | End: 2021-02-01

## 2020-09-04 RX ORDER — BENZONATATE 100 MG/1
200 CAPSULE ORAL ONCE
Status: COMPLETED | OUTPATIENT
Start: 2020-09-04 | End: 2020-09-04

## 2020-09-04 RX ORDER — NAPROXEN 250 MG/1
500 TABLET ORAL ONCE
Status: COMPLETED | OUTPATIENT
Start: 2020-09-04 | End: 2020-09-04

## 2020-09-04 RX ORDER — PREDNISONE 10 MG/1
TABLET ORAL
Qty: 21 TAB | Refills: 0 | Status: SHIPPED | OUTPATIENT
Start: 2020-09-04 | End: 2021-02-01

## 2020-09-04 RX ORDER — AZITHROMYCIN 250 MG/1
TABLET, FILM COATED ORAL
Qty: 6 TAB | Refills: 0 | Status: SHIPPED | OUTPATIENT
Start: 2020-09-04 | End: 2020-09-09

## 2020-09-04 RX ORDER — LISINOPRIL 20 MG/1
20 TABLET ORAL DAILY
COMMUNITY
End: 2021-02-01

## 2020-09-04 RX ORDER — UREA 10 %
220 LOTION (ML) TOPICAL 2 TIMES DAILY
Qty: 10 TAB | Refills: 0 | Status: SHIPPED | OUTPATIENT
Start: 2020-09-04 | End: 2020-09-09

## 2020-09-04 RX ORDER — BENZONATATE 100 MG/1
100 CAPSULE ORAL
Qty: 30 CAP | Refills: 0 | Status: SHIPPED | OUTPATIENT
Start: 2020-09-04 | End: 2020-09-11

## 2020-09-04 RX ADMIN — NAPROXEN 500 MG: 250 TABLET ORAL at 22:06

## 2020-09-04 RX ADMIN — AZITHROMYCIN MONOHYDRATE 500 MG: 500 TABLET ORAL at 22:32

## 2020-09-04 RX ADMIN — BENZONATATE 200 MG: 100 CAPSULE ORAL at 22:06

## 2020-09-04 NOTE — LETTER
[facility] EMERGENCY DEPT 
[address] [address] 
[phone] Work/School Note Date: 9/4/2020 To Whom It May concern: 
 
Jasper Rivera was seen and treated today in the emergency room by the following provider(s): 
Attending Provider: Daniel Monroy MD.   
 
In light of the current COVID-19 pandemic, please excuse your employee from work under the circumstance below: 
 
1) If patient was exposed but without symptoms, he/she should self-isolate at home for 14 days from day of exposure. 2) If patient has symptoms concerning for COVID-19, such as fever, cough, shortness of breath, regardless if patient received testing or not, patient should self-isolate at home until 3 days after symptoms have resolved AND 7 days after symptoms first started, whichever is later. Sincerely, Chris Diallo MD

## 2020-09-04 NOTE — ED TRIAGE NOTES
Pt reports productive & dry cough, SOB, generalized body aches, headaches, chest congestion, upper & lower back pain, and a \"funny taste in mouth\". Pt reports a negative coronavirus test on yesterday.

## 2020-09-05 ENCOUNTER — PATIENT OUTREACH (OUTPATIENT)
Dept: FAMILY MEDICINE CLINIC | Age: 65
End: 2020-09-05

## 2020-09-05 NOTE — ED NOTES
Pt arrived to ED via car with c/o non productive cough and generalized body aches x 3 days. Pt tested negative for covid yesterday. Pt is in no acute distress. Will continue to monitor. See nursing assessment. Safety precautions in place; call light within reach. Emergency Department Nursing Plan of Care       The Nursing Plan of Care is developed from the Nursing assessment and Emergency Department Attending provider initial evaluation. The plan of care may be reviewed in the ED Provider note.     The Plan of Care was developed with the following considerations:   Patient / Family readiness to learn indicated by:verbalized understanding  Persons(s) to be included in education: patient  Barriers to Learning/Limitations:No    Signed     Geovanny Burton RN    9/4/2020   9:24 PM

## 2020-09-05 NOTE — ED PROVIDER NOTES
EMERGENCY DEPARTMENT HISTORY AND PHYSICAL EXAM      Please note that this dictation was completed with the assistance of \"Dragon\", the computer voice recognition software. Quite often unanticipated grammatical, syntax, homophones, and other interpretive errors are inadvertently transcribed by the computer software. Please disregard these errors and any errors that have escaped final proofreading. Thank you. Date: 2020  Patient Name: Shirlene Cook  : 1955  MRN: [id number]  History of Presenting Illness     Chief Complaint   Patient presents with    Cold Symptoms       History Provided By: Patient    HPI: Shirlene Cook, 60 y.o. female with past medical history as documented below presents to the ED with c/o of acute onset of 2 days of generalized body aches, dry cough and SOB. Patient reports also change in taste and smell. Patient states that she recently was tested for COVID-19 and that was negative. Patient denies any fevers, sick contacts, exposure to COVID-19. She has taken over-the-counter medications without significant relief of symptoms. Pt denies any other alleviating or exacerbating factors. Additionally, pt specifically denies any recent fever, chills, headache, nausea, vomiting, abdominal pain, CP, lightheadedness, dizziness, numbness, weakness, lower extremity swelling, heart palpitations, urinary sxs, diarrhea, constipation, melena, hematochezia. There are no other complaints, changes or physical findings pertinent to the HPI at this time.     PCP: Ash Ely MD    Past History   Past Medical History:  Past Medical History:   Diagnosis Date    Hypercholesterolemia     Hypertension        Past Surgical History:  Past Surgical History:   Procedure Laterality Date    HX GYN      Hysterectomy       Family History:  Family History   Problem Relation Age of Onset    Asthma Mother     Elevated Lipids Sister     Hypertension Sister        Social History:  Social History Tobacco Use    Smoking status: Former Smoker     Years: 1.00    Smokeless tobacco: Never Used   Substance Use Topics    Alcohol use: Yes    Drug use: No       Allergies: Allergies   Allergen Reactions    Tylenol [Acetaminophen] Rash       Current Medications:  No current facility-administered medications on file prior to encounter. Current Outpatient Medications on File Prior to Encounter   Medication Sig Dispense Refill    lisinopriL (PRINIVIL, ZESTRIL) 20 mg tablet Take 20 mg by mouth daily. Indications: high blood pressure      losartan (COZAAR) 25 mg tablet Take 1 Tab by mouth daily. Indications: hypertension 10 Tab 2    rosuvastatin (CRESTOR) 20 mg tablet Take 1 Tab by mouth nightly. 90 Tab 3    guaiFENesin-dextromethorphan SR (MUCINEX DM) 600-30 mg per tablet Take 1 Tab by mouth two (2) times a day. 20 Tab 0    hydroCHLOROthiazide (HYDRODIURIL) 12.5 mg tablet Take 1 Tab by mouth daily. 10 Tab 0    meclizine (ANTIVERT) 25 mg tablet Take 1 Tab by mouth every six (6) hours as needed. 20 Tab 0     Review of Systems   Review of Systems   Constitutional: Negative. Negative for chills and fever. HENT: Negative. Negative for congestion, facial swelling, rhinorrhea, sore throat, trouble swallowing and voice change. Eyes: Negative. Respiratory: Positive for cough and shortness of breath. Negative for apnea, chest tightness and wheezing. Cardiovascular: Negative. Negative for chest pain, palpitations and leg swelling. Gastrointestinal: Negative. Negative for abdominal distention, abdominal pain, blood in stool, constipation, diarrhea, nausea and vomiting. Endocrine: Negative. Negative for cold intolerance, heat intolerance and polyuria. Genitourinary: Negative. Negative for difficulty urinating, dysuria, flank pain, frequency, hematuria and urgency. Musculoskeletal: Positive for myalgias. Negative for arthralgias, back pain, neck pain and neck stiffness. Skin: Negative. Negative for color change and rash. Neurological: Negative. Negative for dizziness, syncope, facial asymmetry, speech difficulty, weakness, light-headedness, numbness and headaches. Hematological: Negative. Does not bruise/bleed easily. Psychiatric/Behavioral: Negative. Negative for confusion and self-injury. The patient is not nervous/anxious. Physical Exam   Physical Exam  Vitals signs and nursing note reviewed. Constitutional:       General: She is not in acute distress. Appearance: She is well-developed. She is not diaphoretic. HENT:      Head: Normocephalic and atraumatic. Mouth/Throat:      Pharynx: No oropharyngeal exudate. Eyes:      Conjunctiva/sclera: Conjunctivae normal.      Pupils: Pupils are equal, round, and reactive to light. Neck:      Musculoskeletal: Normal range of motion. Cardiovascular:      Rate and Rhythm: Normal rate and regular rhythm. Heart sounds: Normal heart sounds. No murmur. No friction rub. No gallop. Pulmonary:      Effort: Pulmonary effort is normal. No respiratory distress. Breath sounds: Normal breath sounds. No wheezing or rales. Chest:      Chest wall: No tenderness. Abdominal:      General: Bowel sounds are normal. There is no distension. Palpations: Abdomen is soft. There is no mass. Tenderness: There is no abdominal tenderness. There is no guarding or rebound. Musculoskeletal: Normal range of motion. General: No tenderness or deformity. Skin:     General: Skin is warm. Findings: No rash. Neurological:      Mental Status: She is alert and oriented to person, place, and time. Cranial Nerves: No cranial nerve deficit. Motor: No abnormal muscle tone. Coordination: Coordination normal.      Deep Tendon Reflexes: Reflexes normal.         Diagnostic Study Results     Labs -  No results found for this or any previous visit (from the past 24 hour(s)).     Radiologic Studies -   XR CHEST PORT   Final Result   IMPRESSION:    1. Vague heterogeneous opacities in the bilateral mid and lower lungs, favored   to represent multifocal pneumonia, and in particular suspicious for viral   pneumonia. CT Results  (Last 48 hours)    None        CXR Results  (Last 48 hours)               09/04/20 2146  XR CHEST PORT Final result    Impression:  IMPRESSION:    1. Vague heterogeneous opacities in the bilateral mid and lower lungs, favored   to represent multifocal pneumonia, and in particular suspicious for viral   pneumonia. Narrative:  EXAM:  XR CHEST PORT       INDICATION:   cough, URI       COMPARISON: Chest radiograph 12/1/2017. FINDINGS: AP radiograph of the chest was obtained. Vague heterogeneous opacities noted throughout the lungs, particularly in the   right lower lung and left mid and lower lung. There is no significant pleural   effusion. No pneumothorax. Heart size is normal. Calcifications of the thoracic   aorta. No acute osseous abnormalities. Medical Decision Making   I reviewed the vital signs, available nursing notes, past medical history, past surgical history, family history and social history. Vital Signs-Reviewed the patient's vital signs. Patient Vitals for the past 24 hrs:   Temp Pulse Resp BP SpO2   09/04/20 2238 98.3 °F (36.8 °C) 80 18 128/70 100 %   09/04/20 2210  87 16 136/72 99 %   09/04/20 1953 98 °F (36.7 °C) (!) 104 14 140/73 94 %       Pulse Oximetry Analysis - 94% on RA    Cardiac Monitor:   Rate: 87 bpm  Rhythm: Normal Sinus Rhythm      Records Reviewed: Nursing Notes, Old Medical Records, Previous electrocardiograms, Previous Radiology Studies and Previous Laboratory Studies    Provider Notes (Medical Decision Making):   Pt presents with acute URI symptoms including nasal congestion, rhinorrhea and sore throat. Pt also has c/o of cough without chest pain or wheezing.  Pt is well-appearing with stable vitals and benign exam; symptoms are consistent with an uncomplicated URI. DDx: acute bronchitis, bacterial sinusitis vs. pharyngitis, migraine, flu. Symptomatic therapy suggested: acetaminophen, ibuprofen, antihistamine-decongestant of choice, cough suppressant of choice. Increase fluids, use vaporizer, stay in steamy bathroom tid 15 min prn severe cough, tylenol as needed, rest, avoid smoky areas. Lack of antibiotic effectiveness discussed with her. Symptomatic therapy suggested: gargle for sore throat, use mist at bedside for congestion. Apply facial warm packs for sinus pain or use nasal saline sprays. Follow up prn if not better in 72 hours. ED Course:   I am the first provider for this patient's ED visit today. Initial assessment performed. I discussed presenting problems, concerns and my formulated plan for today's visit with the patient and any available family members at bedside. I encouraged them to ask questions as they arise throughout the visit. HYPERTENSION COUNSELING  For 10 minutes, education was provided to the patient today regarding their hypertension. Patient is made aware of their elevated blood pressure and is instructed to follow up this week with their Primary Care for a recheck. Patient is counseled regarding consequences of chronic, uncontrolled hypertension including kidney disease, heart disease, stroke or even death. Patient states their understanding and agrees to follow up this week. Additionally, during their visit, I discussed sodium restriction, maintaining ideal body weight and regular exercise program as physiologic means to achieve blood pressure control. The patient will strive towards this. I reviewed our electronic medical record system for any past medical records that were available that may contribute to the patient's current condition, the nursing notes and vital signs from today's visit.   Werner Deleon MD    ED Orders Placed :  Orders Placed This Encounter    XR CHEST PORT    lisinopriL (PRINIVIL, ZESTRIL) 20 mg tablet    benzonatate (TESSALON) capsule 200 mg    naproxen (NAPROSYN) tablet 500 mg    azithromycin (ZITHROMAX) tablet 500 mg    azithromycin (Zithromax Z-Juno) 250 mg tablet    benzonatate (Tessalon Perles) 100 mg capsule    predniSONE (STERAPRED DS) 10 mg dose pack    albuterol (PROVENTIL HFA, VENTOLIN HFA, PROAIR HFA) 90 mcg/actuation inhaler    zinc sulfate 220 mg tablet    ascorbic acid, vitamin C, (VITAMIN C) 500 mg tablet     ED Medications Administered:  Medications   benzonatate (TESSALON) capsule 200 mg (200 mg Oral Given 9/4/20 2206)   naproxen (NAPROSYN) tablet 500 mg (500 mg Oral Given 9/4/20 2206)   azithromycin (ZITHROMAX) tablet 500 mg (500 mg Oral Given 9/4/20 2232)           Progress Note:  Given concerns for COVID-19 infection in this patient, I spent extra time to ensure proper and full PPE was used for the initial assessment and for subsequent patient encounters for updates and reassessments. This was done to help combat the transmission of the virus to myself and other patients and staff in the emergency department. Progress note:  Pt notes feeling better after ED treatment. Pt ambulated with pulse ox with saturations maintain > 92% and tolerated well. Discussed lab and imaging findings with pt, specifically noting possible COVID-19 infection. Patient instructed on proper hygiene and self-quarantine, as well as lying prone for 3 hours a day. Pt instructed to self-isolate at home until 3 days after symptoms have resolved AND 7 days after symptoms first started, whichever is later. Provided with [facility] handout for likely COVID infection recommendations. Pt will follow up with health department or this emergency department immediately should symptoms worsen at any time as instructed. All questions have been answered, pt voiced understanding and agreement with plan.      Progress Note:  Patient has been reassessed and reports feeling better and symptoms have improved significantly after ED treatment. Patient feels comfortable going home with close follow-up. Kapil Zaragoza's final labs and imaging have been reviewed with her and available family and/or caregiver. They have been counseled regarding her diagnosis. She verbally conveys understanding and agreement of the signs, symptoms, diagnosis, treatment and prognosis and additionally agrees to follow up as recommended with Dr. Tamie Worrell MD and/or specialist in 24 - 48 hours. She also agrees with the care-plan we created together and conveys that all of her questions have been answered. I have also put together some discharge instructions for her that include: 1) educational information regarding their diagnosis, 2) how to care for their diagnosis at home, as well a 3) list of reasons why they would want to return to the ED prior to their follow-up appointment should the patient's condition change or symptoms worsen. I have answered all questions to the patient's satisfaction. Strict return precautions given. She both understood and agreed with plan as discussed. Vital signs stable for discharge. Pt very appreciative of care today. Disposition: Discharge  The pt is ready for discharge. The pt's signs, symptoms, diagnosis, and discharge instructions have been discussed and pt has conveyed their understanding. The pt is to follow up as recommended or return to ER should their symptoms worsen. Plan has been discussed and pt is in full agreement. Plan:  1. Return precautions as discussed. 2.   Discharge Medication List as of 9/4/2020 10:13 PM      START taking these medications    Details   azithromycin (Zithromax Z-Juno) 250 mg tablet Take 2 tablets (500mg) on day 1, and then 1 tablet (250mg) daily for days 2-5., Normal, Disp-6 Tab,R-0      benzonatate (Tessalon Perles) 100 mg capsule Take 1 Cap by mouth three (3) times daily as needed for Cough for up to 7 days. , Normal, Disp-30 Cap,R-0      predniSONE (STERAPRED DS) 10 mg dose pack Take as prescribed, Normal, Disp-21 Tab,R-0      albuterol (PROVENTIL HFA, VENTOLIN HFA, PROAIR HFA) 90 mcg/actuation inhaler Take 2 Puffs by inhalation every four (4) hours as needed for Wheezing., Normal, Disp-1 Inhaler,R-0      zinc sulfate 220 mg tablet Take 1 Tab by mouth two (2) times a day for 5 days. , Normal, Disp-10 Tab,R-0      ascorbic acid, vitamin C, (VITAMIN C) 500 mg tablet Take 1 Tab by mouth two (2) times a day for 5 days. , Normal, Disp-10 Tab,R-0         CONTINUE these medications which have NOT CHANGED    Details   lisinopriL (PRINIVIL, ZESTRIL) 20 mg tablet Take 20 mg by mouth daily. Indications: high blood pressure, Historical Med      losartan (COZAAR) 25 mg tablet Take 1 Tab by mouth daily. Indications: hypertension, Normal, Disp-10 Tab, R-2      rosuvastatin (CRESTOR) 20 mg tablet Take 1 Tab by mouth nightly., Normal, Disp-90 Tab, R-3      guaiFENesin-dextromethorphan SR (MUCINEX DM) 600-30 mg per tablet Take 1 Tab by mouth two (2) times a day., Normal, Disp-20 Tab, R-0      hydroCHLOROthiazide (HYDRODIURIL) 12.5 mg tablet Take 1 Tab by mouth daily. , Normal, Disp-10 Tab, R-0      meclizine (ANTIVERT) 25 mg tablet Take 1 Tab by mouth every six (6) hours as needed., Normal, Disp-20 Tab, R-0           3. Follow-up Information     Follow up With Specialties Details Why Contact Info    [facility] EMERGENCY DEPT Emergency Medicine   [address]    Derrick Farrell MD Family Medicine  As needed, If symptoms worsen [address]  [facility] [address]  [address] [address]      Derrick Farrell MD Family Medicine   [address] [place] [address]  [address] [address] [phone]      [facility] EMERGENCY DEPT Emergency Medicine   [facility]  [phone]          Instructed to return to ED if worse  Diagnosis   Clinical Impression:   1.  Community acquired pneumonia, unspecified laterality    2. Suspected COVID-19 virus infection    3. Educated about COVID-19 virus infection        Attestation:  Cira Limon MD, am the attending of record for this patient. I personally performed the services described in this documentation on this date, 9/4/2020 for patient, Stephy Hart. I have reviewed the chart and verified that the record is accurate and complete. This note will not be viewable in [address]

## 2020-09-05 NOTE — PROGRESS NOTES
Patient contacted regarding recent discharge and COVID-19 risk. Discussed COVID-19 related testing which was not done at this time. Test results were not done. Patient informed of results, if available? n/a    Care Transition Nurse/ Ambulatory Care Manager/ LPN Care Coordinator contacted the patient by telephone to perform post discharge assessment. Verified name and  with patient as identifiers. Patient has following risk factors of: no known risk factors. CTN/ACM/LPN reviewed discharge instructions, medical action plan and red flags related to discharge diagnosis. Reviewed and educated them on any new and changed medications related to discharge diagnosis. Advised obtaining a 90-day supply of all daily and as-needed medications. Advance Care Planning:   Does patient have an Advance Directive: currently not on file; education provided     Education provided regarding infection prevention, and signs and symptoms of COVID-19 and when to seek medical attention with patient who verbalized understanding. Discussed exposure protocols and quarantine from [address] you at higher risk for severe illness  and given an opportunity for questions and concerns. The patient agrees to contact the COVID-19 hotline [phone] or PCP office for questions related to their healthcare. CTN/ACM/LPN provided contact information for future reference. From CDC: Are you at higher risk for severe illness?  Wash your hands often.  Avoid close contact (6 feet, which is about two arm lengths) with people who are sick.  Put distance between yourself and other people if COVID-19 is spreading in your community.  Clean and disinfect frequently touched surfaces.  Avoid all cruise travel and non-essential air travel.  Call your healthcare professional if you have concerns about COVID-19 and your underlying condition or if you are sick.     For more information on steps you can take to protect yourself, see CDC's How to Protect Yourself      Patient/family/caregiver given information for Get[facility] Loop and agrees to enroll no  Patient's preferred e-mail:  n/a  Patient's preferred phone number: n/a  Based on Loop alert triggers, patient will be contacted by nurse care manager for worsening symptoms. Plan for follow-up call in 7-14 days based on severity of symptoms and risk factors.

## 2020-09-05 NOTE — DISCHARGE INSTRUCTIONS
Patient Education     Prevention steps for patients with confirmed or suspected COVID-19 who do not need to be hospitalized    Timing for Self-Isolation    If you have been exposed but do not have symptoms:  If you suspect you have been exposed to someone with COVID-19 and don't have any symptoms, you should self-isolate at home for 14 days from the time of exposure. If you have symptoms whether or not you have been tested: If you have symptoms suggestive of COVID-19, such as fever or cough, regardless of whether you have been tested, you should self-isolate at home until 72 hours (3 days) after your symptoms have completely resolved AND 7 days after your symptoms first started, whichever is later. How to Properly Self-Isolate Due to COVID-19    Stay home except to get medical care  People who are mildly ill with COVID-19 are able to isolate at home during their illness. You should restrict activities outside your home, except for getting medical care. Do not go to work, school, or public areas. Avoid using public transportation, ride-sharing, or taxis. If you or a loved one must go out, please make sure to wash hands properly immediately on returning home. Disinfect touched surfaces. Do not touch your face. Separate yourself from other people and animals in your home  People: As much as possible, you should stay in a specific room and away from other people in your home. Also, you should use a separate bathroom, if available. Animals: You should restrict contact with pets and other animals while you are sick with COVID-19, just like you would around other people. Although there have not been reports of pets or other animals becoming sick with COVID-19, it is still recommended that people sick with COVID-19 limit contact with animals until more information is known about the virus. When possible, have another member of your household care for your animals while you are sick.  If you are sick with COVID-19, avoid contact with your pet, including petting, snuggling, being kissed or licked, and sharing food. If you must care for your pet or be around animals while you are sick, wash your hands before and after you interact with pets and wear a facemask. Call ahead before visiting your doctor  If you have a medical appointment, call the healthcare provider and tell them that you have or may have COVID-19. This will help the healthcare providers office take steps to keep other people from getting infected or exposed. Wear a facemask  You should wear a facemask when you are around other people (e.g., sharing a room or vehicle) or pets and before you enter a healthcare providers office. If you are not able to wear a facemask (for example, because it causes trouble breathing), then people who live with you should not stay in the same room with you, or they should wear a facemask if they enter your room. Cover your coughs and sneezes  Cover your mouth and nose with a tissue when you cough or sneeze. Throw used tissues in a lined trash can. Immediately wash your hands with soap and water for at least 20 seconds or, if soap and water are not available, clean your hands with an alcohol-based hand  that contains at least 60% alcohol. Clean your hands often  Wash your hands often with soap and water for at least 20 seconds, especially after blowing your nose, coughing, or sneezing; going to the bathroom; and before eating or preparing food. If soap and water are not readily available, use an alcohol-based hand  with at least 60% alcohol, covering all surfaces of your hands and rubbing them together until they feel dry. Soap and water are the best option if hands are visibly dirty. Avoid touching your eyes, nose, and mouth with unwashed hands.     Avoid sharing personal household items  You should not share dishes, drinking glasses, cups, eating utensils, towels, or bedding with other people or pets in your home. After using these items, they should be washed thoroughly with soap and water. Clean all high-touch surfaces everyday  High touch surfaces include counters, tabletops, doorknobs, bathroom fixtures, toilets, phones, keyboards, tablets, and bedside tables. Also, clean any surfaces that may have blood, stool, or body fluids on them. Use a household cleaning spray or wipe, according to the label instructions. Labels contain instructions for safe and effective use of the cleaning product including precautions you should take when applying the product, such as wearing gloves and making sure you have good ventilation during use of the product. Monitor your symptoms  Seek prompt medical attention if your illness is worsening (e.g., difficulty breathing). Before seeking care, call your healthcare provider and tell them that you have, or are being evaluated for, COVID-19. Put on a facemask before you enter the facility. These steps will help the healthcare providers office to keep other people in the office or waiting room from getting infected or exposed. Ask your healthcare provider to call the local or [facility] health department. Persons who are placed under active monitoring or facilitated self-monitoring should follow instructions provided by their local health department or occupational health professionals, as appropriate. When working with your local health department check their available hours. If you have a medical emergency and need to call 911, notify the dispatch personnel that you have, or are being evaluated for COVID-19. If possible, put on a facemask before emergency medical services arrive. Discontinuing home isolation  Patients with confirmed COVID-19 should remain under home isolation precautions until the risk of secondary transmission to others is thought to be low based on the above CDC recommendations.  The decision to discontinue home isolation precautions should be made on a case-by-case basis, in consultation with healthcare providers and state and local health departments. Oupatient testing  You can now get tested on an outpatient basis if you are showing symptoms of Covid19 at one of the [facility] or [facility] by appointment only. BETTER [facility]:  [email or web address] to make an appointment. [facility]: Elaine. to make an appointment. This service is currently offered at the [facility] and [facility]. To make an appointment, call your preferred Center and enter 5 during the recording to speak with the [employer]. All [facility], including the [facility], remain Open Every Day for Walk-in care of Illness and Injury. Who can be tested? In order to make an appointment to be tested, you must meet screening criteria, which are based on CDC guidance. The screening criteria include either of the following conditions:   You have a symptom or symptoms of COVID-19 (fever, coughing, shortness of breath, sore throat).  You are a healthcare worker or . What is the cost?  For insured patients, there is no out-of-pocket expense. The visit will be submitted to patients' insurance. [facility] accepts all major insurance plans, including Medicare and Medicaid. For self-pay patients, the cost is $90 for the exam plus a separate bill from the lab, which is $51.31 in [place]. What is the process for being tested? First, make an appointment by calling your local Designated Patient [facility]. Please have your insurance card on hand when you call. Bring your insurance card and picture ID with you to your appointment. Upon arrival, follow the Coronavirus Testing signs and park in a designated testing parking spot.  A staff member will meet you at your car to verify your information, complete your registration, check vitals, and take a sample for testing. The sample will be sent to a reference lab for testing. Self-quarantine until you receive your results. A nurse will call you once your results are available, and will provide you with guidance and answer any questions you may have. Further resources and [address]  Please visit the [facility] website for more information. [email or web address]. [place] residents with questions about COVID-19 can call the [address] at [facility] [employer]. Pneumonia: Care Instructions  Your Care Instructions     Pneumonia is an infection of the lungs. Most cases are caused by infections from bacteria or viruses. Pneumonia may be mild or very severe. If it is caused by bacteria, you will be treated with antibiotics. It may take a few weeks to a few months to recover fully from pneumonia, depending on how sick you were and whether your overall health is good. Follow-up care is a key part of your treatment and safety. Be sure to make and go to all appointments, and call your doctor if you are having problems. It's also a good idea to know your test results and keep a list of the medicines you take. How can you care for yourself at home? · Take your antibiotics exactly as directed. Do not stop taking the medicine just because you are feeling better. You need to take the full course of antibiotics. · Take your medicines exactly as prescribed. Call your doctor if you think you are having a problem with your medicine. · Get plenty of rest and sleep. You may feel weak and tired for a while, but your energy level will improve with time. · To prevent dehydration, drink plenty of fluids, enough so that your urine is light yellow or clear like water. Choose water and other caffeine-free clear liquids until you feel better.  If you have kidney, heart, or liver disease and have to limit fluids, talk with your doctor before you increase the amount of fluids you drink. · Take care of your cough so you can rest. A cough that brings up mucus from your lungs is common with pneumonia. It is one way your body gets rid of the infection. But if coughing keeps you from resting or causes severe fatigue and chest-wall pain, talk to your doctor. He or she may suggest that you take a medicine to reduce the cough. · Use a vaporizer or humidifier to add moisture to your bedroom. Follow the directions for cleaning the machine. · Do not smoke or allow others to smoke around you. Smoke will make your cough last longer. If you need help quitting, talk to your doctor about stop-smoking programs and medicines. These can increase your chances of quitting for good. · Take an over-the-counter pain medicine, such as acetaminophen (Tylenol), ibuprofen (Advil, Motrin), or naproxen (Aleve). Read and follow all instructions on the label. · Do not take two or more pain medicines at the same time unless the doctor told you to. Many pain medicines have acetaminophen, which is Tylenol. Too much acetaminophen (Tylenol) can be harmful. · If you were given a spirometer to measure how well your lungs are working, use it as instructed. This can help your doctor tell how your recovery is going. · To prevent pneumonia in the future, talk to your doctor about getting a flu vaccine (once a year) and a pneumococcal vaccine (one time only for most people). When should you call for help? Call 911 anytime you think you may need emergency care. For example, call if:    · You have severe trouble breathing. Call your doctor now or seek immediate medical care if:    · You cough up dark brown or bloody mucus (sputum).     · You have new or worse trouble breathing.     · You are dizzy or lightheaded, or you feel like you may faint.    Watch closely for changes in your health, and be sure to contact your doctor if:    · You have a new or higher fever.     · You are coughing more deeply or more often.     · You are not getting better after 2 days (48 hours).     · You do not get better as expected. Where can you learn more? Go to http://flory-flaquito.info/  Enter D336 in the search box to learn more about \"Pneumonia: Care Instructions. \"  Current as of: February 24, 2020               Content Version: 12.6  © 2006-2020 [employer]. Care instructions adapted under license by "[employer] (which disclaims liability or warranty for this information). If you have questions about a medical condition or this instruction, always ask your healthcare professional. [address] any warranty or liability for your use of this information.

## 2020-09-05 NOTE — ED NOTES
Discharge Instructions Reviewed with patient per this nurse. Discharge instructions given to patient per this nurse. Patient able to return verbalize discharge instructions. Paper copy of discharge instructions given. No RX given to patient but instructed that 6 Rx sent electroncially to pharmacy on record per MD. Patient condition stable, Respiratory status WNL, Neurostatus intact.  Ambulatory out of er, to home with Self

## 2020-11-02 NOTE — DISCHARGE INSTRUCTIONS
Spoke with patient, she states she has on going spinal issues and recurring back pain and all over joint pain that worsening. Patient was offered in clinic visit for further evaluation. Appt scheduled   Vertigo: Care Instructions  Your Care Instructions  Vertigo is the feeling that you or your surroundings are moving when there is no actual movement. It is often described as a feeling of spinning, whirling, falling, or tilting. Vertigo may make you vomit or feel nauseated. You may have trouble standing or walking and may lose your balance. Vertigo is often related to an inner ear problem, but it can have other more serious causes. If vertigo continues, you may need more tests to find its cause. Follow-up care is a key part of your treatment and safety. Be sure to make and go to all appointments, and call your doctor if you are having problems. Its also a good idea to know your test results and keep a list of the medicines you take. How can you care for yourself at home? · Do not lie flat on your back. Prop yourself up slightly. This may reduce the spinning feeling. Keep your eyes open. · Move slowly so that you do not fall. · If your doctor recommends medicine, take it exactly as directed. · Do not drive while you are having vertigo. Certain exercises, called Bryant-Daroff exercises, can help decrease vertigo. To do Bryant-Daroff exercises:  · Sit on the edge of a bed or sofa and quickly lie down on the side that causes the worst vertigo. Lie on your side with your ear down. · Stay in this position for at least 30 seconds or until the vertigo goes away. · Sit up. If this causes vertigo, wait for it to stop. · Repeat the procedure on the other side. · Repeat this 10 times. Do these exercises 2 times a day until the vertigo is gone. When should you call for help? Call 911 anytime you think you may need emergency care. For example, call if:  · You passed out (lost consciousness). · You have symptoms of a stroke. These may include:  ¨ Sudden numbness, tingling, weakness, or loss of movement in your face, arm, or leg, especially on only one side of your body. ¨ Sudden vision changes.   ¨ Sudden trouble speaking. ¨ Sudden confusion or trouble understanding simple statements. ¨ Sudden problems with walking or balance. ¨ A sudden, severe headache that is different from past headaches. Call your doctor now or seek immediate medical care if:  · Vertigo occurs with a fever, a headache, or ringing in your ears. · You have new or increased nausea and vomiting. Watch closely for changes in your health, and be sure to contact your doctor if:  · Vertigo gets worse or happens more often. · Vertigo has not gotten better after 2 weeks. Where can you learn more? Go to http://flory-flaquito.info/. Enter N210 in the search box to learn more about \"Vertigo: Care Instructions. \"  Current as of: July 29, 2016  Content Version: 11.3  © 5801-2130 [employer]. Care instructions adapted under license by [employer] (which disclaims liability or warranty for this information). If you have questions about a medical condition or this instruction, always ask your healthcare professional. [place] [address] any warranty or liability for your use of this information.

## 2020-12-21 ENCOUNTER — HOSPITAL ENCOUNTER (EMERGENCY)
Age: 65
Discharge: HOME | End: 2020-12-21
Attending: EMERGENCY MEDICINE

## 2020-12-21 VITALS
RESPIRATION RATE: 18 BRPM | HEIGHT: 69 IN | SYSTOLIC BLOOD PRESSURE: 147 MMHG | OXYGEN SATURATION: 98 % | HEART RATE: 82 BPM | WEIGHT: 229 LBS | BODY MASS INDEX: 33.92 KG/M2 | TEMPERATURE: 97.9 F | DIASTOLIC BLOOD PRESSURE: 79 MMHG

## 2020-12-21 DIAGNOSIS — Z76.0 MEDICATION REFILL: ICD-10-CM

## 2020-12-21 DIAGNOSIS — S60.444A EXTERNAL CONSTRICTION OF RIGHT RING FINGER, INITIAL ENCOUNTER: Primary | ICD-10-CM

## 2020-12-21 PROCEDURE — 99283 EMERGENCY DEPT VISIT LOW MDM: CPT

## 2020-12-21 RX ORDER — AMLODIPINE BESYLATE 5 MG/1
5 TABLET ORAL DAILY
Qty: 30 TAB | Refills: 0 | Status: SHIPPED | OUTPATIENT
Start: 2020-12-21 | End: 2021-01-20

## 2020-12-21 RX ORDER — AMLODIPINE BESYLATE 5 MG/1
5 TABLET ORAL DAILY
COMMUNITY
End: 2020-12-21

## 2020-12-21 NOTE — ED PROVIDER NOTES
EMERGENCY DEPARTMENT HISTORY AND PHYSICAL EXAM    Date: 12/21/2020  Patient Name: Nelda Guzman    History of Presenting Illness     Chief Complaint   Patient presents with    Finger Swelling         History Provided By: Patient    HPI: Nelda Guzman is a 63 y.o. female with a PMH of HTN and hypercholesterolemia who presents with finger stuck on her R ring finger x 1wk. Pt states she has tried several methods to remove it but has been unsuccessful. Pt denies any injury to the finger just states it's swollen and she can't get it off. Pt denies any pain at this time. PCP: Chloé Musa MD    Current Outpatient Medications   Medication Sig Dispense Refill    amLODIPine (NORVASC) 5 mg tablet Take 1 Tab by mouth daily for 30 days. 30 Tab 0    rosuvastatin (CRESTOR) 20 mg tablet Take 1 Tab by mouth nightly. 90 Tab 3    lisinopriL (PRINIVIL, ZESTRIL) 20 mg tablet Take 20 mg by mouth daily. Indications: high blood pressure      predniSONE (STERAPRED DS) 10 mg dose pack Take as prescribed 21 Tab 0    albuterol (PROVENTIL HFA, VENTOLIN HFA, PROAIR HFA) 90 mcg/actuation inhaler Take 2 Puffs by inhalation every four (4) hours as needed for Wheezing. 1 Inhaler 0    guaiFENesin-dextromethorphan SR (MUCINEX DM) 600-30 mg per tablet Take 1 Tab by mouth two (2) times a day. 20 Tab 0    losartan (COZAAR) 25 mg tablet Take 1 Tab by mouth daily. Indications: hypertension 10 Tab 2    hydroCHLOROthiazide (HYDRODIURIL) 12.5 mg tablet Take 1 Tab by mouth daily. 10 Tab 0    meclizine (ANTIVERT) 25 mg tablet Take 1 Tab by mouth every six (6) hours as needed.  20 Tab 0       Past History     Past Medical History:  Past Medical History:   Diagnosis Date    Hypercholesterolemia     Hypertension        Past Surgical History:  Past Surgical History:   Procedure Laterality Date    HX GYN      Hysterectomy       Family History:  Family History   Problem Relation Age of Onset    Asthma Mother     Elevated Lipids Sister    [facility] Hypertension Sister        Social History:  Social History     Tobacco Use    Smoking status: Former Smoker     Years: 1.00    Smokeless tobacco: Never Used   Substance Use Topics    Alcohol use: Yes    Drug use: No       Allergies: Allergies   Allergen Reactions    Tylenol [Acetaminophen] Rash         Review of Systems   Review of Systems   Constitutional: Negative for chills and fever. Musculoskeletal: Positive for joint swelling and myalgias. Allergic/Immunologic: Negative for immunocompromised state. Neurological: Negative for speech difficulty and weakness. Psychiatric/Behavioral: Negative for self-injury. All other systems reviewed and are negative. Physical Exam     Vitals:    12/21/20 1140   BP: (!) 147/79   Pulse: 82   Resp: 18   Temp: 97.9 °F (36.6 °C)   SpO2: 98%   Weight: 103.9 kg (229 lb)   Height: 5' 9\" (1.753 m)     Physical Exam  Vitals signs and nursing note reviewed. Constitutional:       General: She is not in acute distress. Appearance: She is well-developed. HENT:      Head: Normocephalic and atraumatic. Eyes:      Conjunctiva/sclera: Conjunctivae normal.   Cardiovascular:      Rate and Rhythm: Normal rate and regular rhythm. Heart sounds: Normal heart sounds. Pulmonary:      Effort: Pulmonary effort is normal. No respiratory distress. Breath sounds: Normal breath sounds. No wheezing or rales. Musculoskeletal:      Right hand: She exhibits swelling (minimal swelling between MCP to PIP joint with two rings stuck on finger). She exhibits normal range of motion, no tenderness, no bony tenderness, normal capillary refill and no deformity. Skin:     General: Skin is warm and dry. Neurological:      Mental Status: She is alert and oriented to person, place, and time. Psychiatric:         Behavior: Behavior normal.         Thought Content:  Thought content normal.         Judgment: Judgment normal.           Diagnostic Study Results     Labs -   No results found for this or any previous visit (from the past 12 hour(s)). Radiologic Studies -   No orders to display     CT Results  (Last 48 hours)    None        CXR Results  (Last 48 hours)    None            Medical Decision Making   I am the first provider for this patient. I reviewed the vital signs, available nursing notes, past medical history, past surgical history, family history and social history. Vital Signs-Reviewed the patient's vital signs. Records Reviewed: Nursing Notes and Old Medical Records    Disposition:  Discharged    DISCHARGE NOTE:   1:54p      Care plan outlined and precautions discussed. Patient has no new complaints, changes, or physical findings. All medications were reviewed with the patient; will d/c home. All of pt's questions and concerns were addressed. Patient was instructed and agrees to follow up with PCP, as well as to return to the ED upon further deterioration. Patient is ready to go home. Follow-up Information     Follow up With Specialties Details Why Contact Info    Jona Rubinstein, MD [facility] Medicine Schedule an appointment as soon as possible for a visit in 1 week As needed [facility]  [phone]            Discharge Medication List as of 12/21/2020  1:54 PM      CONTINUE these medications which have CHANGED    Details   amLODIPine (NORVASC) 5 mg tablet Take 1 Tab by mouth daily for 30 days. , Normal, Disp-30 Tab, R-0         CONTINUE these medications which have NOT CHANGED    Details   rosuvastatin (CRESTOR) 20 mg tablet Take 1 Tab by mouth nightly., Normal, Disp-90 Tab, R-3      lisinopriL (PRINIVIL, ZESTRIL) 20 mg tablet Take 20 mg by mouth daily.  Indications: high blood pressure, Historical Med      predniSONE (STERAPRED DS) 10 mg dose pack Take as prescribed, Normal, Disp-21 Tab,R-0      albuterol (PROVENTIL HFA, VENTOLIN HFA, PROAIR HFA) 90 mcg/actuation inhaler Take 2 Puffs by inhalation every four (4) hours as needed for Wheezing., Normal, Disp-1 Inhaler,R-0      guaiFENesin-dextromethorphan SR (MUCINEX DM) 600-30 mg per tablet Take 1 Tab by mouth two (2) times a day., Normal, Disp-20 Tab, R-0      losartan (COZAAR) 25 mg tablet Take 1 Tab by mouth daily. Indications: hypertension, Normal, Disp-10 Tab, R-2      hydroCHLOROthiazide (HYDRODIURIL) 12.5 mg tablet Take 1 Tab by mouth daily. , Normal, Disp-10 Tab, R-0      meclizine (ANTIVERT) 25 mg tablet Take 1 Tab by mouth every six (6) hours as needed., Normal, Disp-20 Tab, R-0             Provider Notes (Medical Decision Making):   Pt presents with two rings stuck on the R ring finger. Attempted to initially remove with just lubrication and was unsuccessful so will use ring cutters to remove. Procedures:  Ring Removal    Date/Time: 12/21/2020 6:45 PM  Performed by: Eliana Beach PA-C  Authorized by: Eliana Beach PA-C     Consent:     Consent obtained:  Verbal    Consent given by:  Patient    Risks discussed:  Pain    Alternatives discussed:  No treatment  Indications:     Indications:  Still Priti stuck on finger  Anesthesia (see MAR for exact dosages): Anesthesia method:  None  Post-procedure details:     Patient tolerance of procedure: Tolerated well, no immediate complications  Comments:      Used ring cutters to remove two rings from R ring finger without any difficulty or complications        Please note that this dictation was completed with Dragon, computer voice recognition software. Quite often unanticipated grammatical, syntax, homophones, and other interpretive errors are inadvertently transcribed by the computer software. Please disregard these errors. Additionally, please excuse any errors that have escaped final proofreading. Diagnosis     Clinical Impression:   1. External constriction of right ring finger, initial encounter    2.  Medication refill

## 2020-12-21 NOTE — ED NOTES
Emergency Department Nursing Plan of Care       The Nursing Plan of Care is developed from the Nursing assessment and Emergency Department Attending provider initial evaluation. The plan of care may be reviewed in the ED Provider note.     The Plan of Care was developed with the following considerations:   Patient / Family readiness to learn indicated by:verbalized understanding  Persons(s) to be included in education: patient  Barriers to Learning/Limitations:No    Signed     Yolis Range    12/21/2020   1:49 PM

## 2020-12-21 NOTE — DISCHARGE INSTRUCTIONS
Patient Education        Medication Refill: Care Instructions  Your Care Instructions     Medicines are a big part of treatment for many health problems. So it can be upsetting to run out of your medicine. It may even be dangerous to stop a medicine suddenly. The doctor may have given you enough medicine to help you until you can see your regular doctor. The doctor has checked you carefully, but problems can develop later. If you notice any problems or new symptoms, get medical treatment right away. Follow-up care is a key part of your treatment and safety. Be sure to make and go to all appointments, and call your doctor if you are having problems. It's also a good idea to know your test results and keep a list of the medicines you take. How can you care for yourself at home? · Be safe with medicines. Take your medicines exactly as prescribed. · Know when you will run out of your medicine. Use a calendar to remind you to get a refill. Don't wait until you have only a few pills left. · Talk with your doctor or pharmacist about your medicine. Find out what to do if you miss a dose. When should you call for help? Call your doctor now or seek immediate medical care if:    · You have problems with your medicine. Watch closely for changes in your health, and be sure to contact your doctor if you have any problems. Where can you learn more? Go to http://www.gray.com/  Enter K326 in the search box to learn more about \"Medication Refill: Care Instructions. \"  Current as of: August 22, 2019               Content Version: 12.6  © 5415-9423 [employer]. Care instructions adapted under license by [employer] (which disclaims liability or warranty for this information).  If you have questions about a medical condition or this instruction, always ask your healthcare professional. [place] [address] any warranty or liability for your use of this information. Patient Education        Object in the Skin: Care Instructions  Your Care Instructions  Small objects (splinters) of wood, metal, glass, or plastic can become embedded in the skin. Thorns from [employer] and other plants also can prick or become stuck in the skin. Splinters can cause an infection if they are not removed. Your doctor probably removed the object and cleaned the skin well. Your doctor may have given you antibiotics to prevent infection and a tetanus shot if you had not had one in the last 5 years or do not know when you had your last one. For a few days, you may have pain and itching in the wound where the object was removed. Follow-up care is a key part of your treatment and safety. Be sure to make and go to all appointments, and call your doctor if you are having problems. It's also a good idea to know your test results and keep a list of the medicines you take. How can you care for yourself at home? · If your doctor told you how to care for your wound, follow your doctor's instructions. If you did not get instructions, follow this general advice:  ? Wash the wound with clean water 2 times a day. Don't use hydrogen peroxide or alcohol, which can slow healing. ? You may cover the wound with a thin layer of petroleum jelly, such as Vaseline, and a nonstick bandage. ? Apply more petroleum jelly and replace the bandage as needed. · Your doctor may have used medicine to numb your skin. When it wears off, your pain may return. Take an over-the-counter pain medicine, such as acetaminophen (Tylenol), ibuprofen (Advil, Motrin), or naproxen (Aleve). Read and follow all instructions on the label. · Do not take two or more pain medicines at the same time unless the doctor told you to. Many pain medicines have acetaminophen, which is Tylenol. Too much acetaminophen (Tylenol) can be harmful. · If your doctor prescribed antibiotics, take them as directed.  Do not stop taking them just because you feel better. You need to take the full course of antibiotics. · After 2 or 3 days, if your swelling is gone, apply a heating pad set on low or a warm cloth to your wound area. Some doctors suggest that you go back and forth between hot and cold. Put a thin cloth between the heating pad and your skin. · It may help to prop up the affected part of your body on a pillow anytime you sit or lie down during the next 3 days. Try to keep it above the level of your heart. This will help reduce swelling. · Your wound may itch or feel irritated. A little redness and swelling is normal. Do not scratch or rub the wound. When should you call for help? Call your doctor now or seek immediate medical care if:    · The skin near the wound is cool or pale or changes color.     · You have tingling, weakness, or numbness in the area near the wound.     · The wound starts to bleed, and blood soaks the bandage. Oozing small amounts of blood is normal.     · You have trouble moving a limb near the wound.     · You have signs of infection, such as:  ? Increased pain, swelling, warmth, or redness. ? Red streaks leading from the wound. ? Pus draining from the wound. ? A fever. Watch closely for changes in your health, and be sure to contact your doctor if:    · You do not get better as expected. Where can you learn more? Go to http://www.gray.com/  Enter U261 in the search box to learn more about \"Object in the Skin: Care Instructions. \"  Current as of: June 26, 2019               Content Version: 12.6  © 4690-7871 [employer]. Care instructions adapted under license by [employer] (which disclaims liability or warranty for this information). If you have questions about a medical condition or this instruction, always ask your healthcare professional. [address] any warranty or liability for your use of this information.

## 2021-02-01 ENCOUNTER — OFFICE VISIT (OUTPATIENT)
Dept: INTERNAL MEDICINE CLINIC | Age: 66
End: 2021-02-01
Payer: COMMERCIAL

## 2021-02-01 VITALS
OXYGEN SATURATION: 95 % | HEIGHT: 69 IN | BODY MASS INDEX: 35.21 KG/M2 | WEIGHT: 237.7 LBS | DIASTOLIC BLOOD PRESSURE: 81 MMHG | TEMPERATURE: 98.2 F | SYSTOLIC BLOOD PRESSURE: 142 MMHG | HEART RATE: 86 BPM | RESPIRATION RATE: 20 BRPM

## 2021-02-01 DIAGNOSIS — I10 ESSENTIAL HYPERTENSION: ICD-10-CM

## 2021-02-01 DIAGNOSIS — Z12.11 SCREEN FOR COLON CANCER: ICD-10-CM

## 2021-02-01 DIAGNOSIS — E66.9 OBESITY (BMI 35.0-39.9 WITHOUT COMORBIDITY): ICD-10-CM

## 2021-02-01 DIAGNOSIS — E78.00 HYPERCHOLESTEROLEMIA: ICD-10-CM

## 2021-02-01 DIAGNOSIS — Z76.89 ENCOUNTER TO ESTABLISH CARE: Primary | ICD-10-CM

## 2021-02-01 PROCEDURE — 99204 OFFICE O/P NEW MOD 45 MIN: CPT | Performed by: INTERNAL MEDICINE

## 2021-02-01 PROCEDURE — 93000 ELECTROCARDIOGRAM COMPLETE: CPT | Performed by: INTERNAL MEDICINE

## 2021-02-01 RX ORDER — TELMISARTAN AND HYDROCHLORTHIAZIDE 80; 12.5 MG/1; MG/1
1 TABLET ORAL DAILY
Qty: 30 TAB | Refills: 11 | Status: SHIPPED | OUTPATIENT
Start: 2021-02-01

## 2021-02-01 RX ORDER — ROSUVASTATIN CALCIUM 20 MG/1
20 TABLET, COATED ORAL
Qty: 90 TAB | Refills: 3 | Status: SHIPPED | OUTPATIENT
Start: 2021-02-01 | End: 2021-02-02 | Stop reason: DRUGHIGH

## 2021-02-01 NOTE — PROGRESS NOTES
SPORTS MEDICINE AND PRIMARY CARE  Oralia Moralez MD, [address] [address]  Phone:  [phone]  Fax: [phone]    Chief Complaint   Patient presents with    Establish Care       SUBJECTIVE:    Lizbeth Cintron is a 63 y.o. female Patient comes in today saying she wants a \"new overhaul\". She comes in today as a new patient and is seen for evaluation and ongoing care. Current Outpatient Medications   Medication Sig Dispense Refill    rosuvastatin (CRESTOR) 20 mg tablet Take 1 Tab by mouth nightly. 90 Tab 3    telmisartan-hydroCHLOROthiazide (MICARDIS HCT) 80-12.5 mg per tablet Take 1 Tab by mouth daily. 30 Tab 11    meclizine (ANTIVERT) 25 mg tablet Take 1 Tab by mouth every six (6) hours as needed.  20 Tab 0     Past Medical History:   Diagnosis Date    Encounter to [facility] care     Hypercholesterolemia     Hypertension     Obesity (BMI 35.0-39.9 without comorbidity)      Past Surgical History:   Procedure Laterality Date    HX GYN      Hysterectomy     Allergies   Allergen Reactions    Tylenol [Acetaminophen] Rash       REVIEW OF SYSTEMS:  General: negative for - chills or fever  ENT: negative for - headaches, nasal congestion or tinnitus  Respiratory: negative for - cough, hemoptysis, shortness of breath or wheezing  Cardiovascular : negative for - chest pain, edema, palpitations or shortness of breath  Gastrointestinal: negative for - abdominal pain, blood in stools, heartburn or nausea/vomiting  Genito-Urinary: no dysuria, trouble voiding, or hematuria  Musculoskeletal: negative for - gait disturbance, joint pain, joint stiffness or joint swelling  Neurological: no TIA or stroke symptoms  Hematologic: no bruises, no bleeding, no swollen glands  Integument: no lumps, mole changes, nail changes or rash  Endocrine:no malaise/lethargy or unexpected weight changes      Social History     Socioeconomic History    Marital status: SINGLE     Spouse name: Not on file  Number of children: Not on file    Years of education: Not on file    Highest education level: Not on file   Tobacco Use    Smoking status: Former Smoker     Years: 1.00    Smokeless tobacco: Never Used   Substance and Sexual Activity    Alcohol use: Yes     Frequency: Monthly or less     Drinks per session: 1 or 2     Comment: occasional    Drug use: No    Sexual activity: Yes     Partners: Male     Birth control/protection: Condom     Family History   Problem Relation Age of Onset    Asthma Mother     Elevated Lipids Sister     Hypertension Sister    Habits:  She is a lifetime nonsmoker, non drug abuser. Drinks one to two drinks a month. Social History:  The patient is . She lives alone. She completed high school. 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 She is the mother of four children, a daughter and three sons, ages 40-55, and nine grandchildren. [employer]ism preference is [place]. Family History:  Father  [de-identified], \"natural causes\". Mother  in her [de-identified] causing myocardial infarction. Three brothers and three sisters are alive and well. OBJECTIVE:     Visit Vitals  BP (!) 142/81   Pulse 86   Temp 98.2 °F (36.8 °C) (Oral)   Resp 20   Ht 5' 9\" (1.753 m)   Wt 237 lb 11.2 oz (107.8 kg)   SpO2 95%   BMI 35.10 kg/m²     CONSTITUTIONAL: well , well nourished, appears age appropriate  EYES: perrla, eom intact  ENMT:moist mucous membranes, pharynx clear  NECK: supple. Thyroid normal  RESPIRATORY: Chest: clear bilaterally  CARDIOVASCULAR: Heart: regular rate and rhythm  GASTROINTESTINAL: Abdomen: soft, bowel sounds active  HEMATOLOGIC: no pathological lymph nodes palpated  MUSCULOSKELETAL: Extremities: no edema, pulse 1+   INTEGUMENT: No unusual rashes or suspicious skin lesions noted. Nails appear normal.  NEUROLOGIC: non-focal exam   MENTAL STATUS: alert and oriented, appropriate affect       ASSESSMENT:   1. Encounter to establish care    2. Obesity (BMI 35.0-39.9 without comorbidity)    3. Essential hypertension    4. Hypercholesterolemia    5. Screen for colon cancer      So this will complete her initial visit with us. We have seen her before, she was an aide for [employer], she reminds me. We note that her BMI reflects obesity and we request a heart healthy, weight reducing diet with physical activity 30 minutes five days a week. Blood pressure is a little higher than I would like to see it. We advise her we would like her blood pressure in the 120s/80. We stopped the Lisinopril because of ill effects, particularly arthritis, and placed her on Micardis. She had an issue with Losartan as she got headaches. We will also add Hydrochlorothiazide to Micardis. She agrees with the plan. Known history of dyslipidemia, for which we renew Crestor. If cholesterol is elevated or not at goal range, we will send her a note and increase her Crestor to allow her to come into goal range. She will come by for a blood pressure check in two weeks and we will see her in about three months, sooner if she has any problems. We advise her she can call us or send us a MyChart note and that we use [facility] emergency room. We also give her GI referral for colonoscopy. I have discussed the diagnosis with the patient and the intended plan as seen in the  orders above. The patient understands and agees with the plan. The patient has   received an after visit summary and questions were answered concerning  future plans  Patient labs and/or xrays were reviewed  Past records were reviewed.     PLAN:  .  Orders Placed This Encounter    MU MAMMO BI SCREENING INCL CAD    URINALYSIS W/ RFLX MICROSCOPIC    CBC WITH AUTOMATED DIFF    METABOLIC PANEL, COMPREHENSIVE    LIPID PANEL    TSH 3RD GENERATION    HEMOGLOBIN A1C WITH EAG    REFERRAL TO GASTROENTEROLOGY    AMB POC EKG ROUTINE W/ 12 LEADS, INTER & REP    rosuvastatin (CRESTOR) 20 mg tablet    telmisartan-hydroCHLOROthiazide (MICARDIS HCT) 80-12.5 mg per tablet       Follow-up and Dispositions    · Return in about 2 weeks (around 2/15/2021) for bp check. ATTENTION:   This medical record was transcribed using an electronic medical records system. Although proofread, it may and can contain electronic and spelling errors. Other human spelling and other errors may be present. Corrections may be executed at a later time. Please feel free to contact us for any clarifications as needed.

## 2021-02-01 NOTE — PROGRESS NOTES
1. Have you been to the ER, urgent care clinic since your last visit? Hospitalized since your last visit? No    2. Have you seen or consulted any other health care providers outside of the [address] [place] [address] since your last visit? Include any pap smears or colon screening.  No

## 2021-02-02 LAB
ALBUMIN SERPL-MCNC: 4.2 G/DL (ref 3.5–5)
ALBUMIN/GLOB SERPL: 1.1 {RATIO} (ref 1.1–2.2)
ALP SERPL-CCNC: 118 U/L (ref 45–117)
ALT SERPL-CCNC: 23 U/L (ref 12–78)
AMORPH CRY URNS QL MICRO: ABNORMAL
ANION GAP SERPL CALC-SCNC: 6 MMOL/L (ref 5–15)
APPEARANCE UR: ABNORMAL
AST SERPL-CCNC: 15 U/L (ref 15–37)
BACTERIA URNS QL MICRO: NEGATIVE /HPF
BASOPHILS # BLD: 0 K/UL (ref 0–0.1)
BASOPHILS NFR BLD: 0 % (ref 0–1)
BILIRUB SERPL-MCNC: 0.3 MG/DL (ref 0.2–1)
BILIRUB UR QL: NEGATIVE
BUN SERPL-MCNC: 9 MG/DL (ref 6–20)
BUN/CREAT SERPL: 11 (ref 12–20)
CALCIUM SERPL-MCNC: 10.7 MG/DL (ref 8.5–10.1)
CAOX CRY URNS QL MICRO: ABNORMAL
CHLORIDE SERPL-SCNC: 105 MMOL/L (ref 97–108)
CHOLEST SERPL-MCNC: 281 MG/DL
CO2 SERPL-SCNC: 28 MMOL/L (ref 21–32)
COLOR UR: ABNORMAL
CREAT SERPL-MCNC: 0.82 MG/DL (ref 0.55–1.02)
DIFFERENTIAL METHOD BLD: ABNORMAL
EOSINOPHIL # BLD: 0.1 K/UL (ref 0–0.4)
EOSINOPHIL NFR BLD: 1 % (ref 0–7)
EPITH CASTS URNS QL MICRO: ABNORMAL /LPF
ERYTHROCYTE [DISTWIDTH] IN BLOOD BY AUTOMATED COUNT: 14.5 % (ref 11.5–14.5)
EST. AVERAGE GLUCOSE BLD GHB EST-MCNC: 126 MG/DL
GLOBULIN SER CALC-MCNC: 4 G/DL (ref 2–4)
GLUCOSE SERPL-MCNC: 70 MG/DL (ref 65–100)
GLUCOSE UR STRIP.AUTO-MCNC: NEGATIVE MG/DL
HBA1C MFR BLD: 6 % (ref 4–5.6)
HCT VFR BLD AUTO: 47.8 % (ref 35–47)
HDLC SERPL-MCNC: 60 MG/DL
HDLC SERPL: 4.7 {RATIO} (ref 0–5)
HGB BLD-MCNC: 15.4 G/DL (ref 11.5–16)
HGB UR QL STRIP: ABNORMAL
IMM GRANULOCYTES # BLD AUTO: 0 K/UL (ref 0–0.04)
IMM GRANULOCYTES NFR BLD AUTO: 0 % (ref 0–0.5)
KETONES UR QL STRIP.AUTO: ABNORMAL MG/DL
LDLC SERPL CALC-MCNC: 196.2 MG/DL (ref 0–100)
LEUKOCYTE ESTERASE UR QL STRIP.AUTO: ABNORMAL
LIPID PROFILE,FLP: ABNORMAL
LYMPHOCYTES # BLD: 2.7 K/UL (ref 0.8–3.5)
LYMPHOCYTES NFR BLD: 46 % (ref 12–49)
MCH RBC QN AUTO: 30.1 PG (ref 26–34)
MCHC RBC AUTO-ENTMCNC: 32.2 G/DL (ref 30–36.5)
MCV RBC AUTO: 93.5 FL (ref 80–99)
MONOCYTES # BLD: 0.5 K/UL (ref 0–1)
MONOCYTES NFR BLD: 8 % (ref 5–13)
NEUTS SEG # BLD: 2.6 K/UL (ref 1.8–8)
NEUTS SEG NFR BLD: 44 % (ref 32–75)
NITRITE UR QL STRIP.AUTO: NEGATIVE
NRBC # BLD: 0 K/UL (ref 0–0.01)
NRBC BLD-RTO: 0 PER 100 WBC
PH UR STRIP: 5 [PH] (ref 5–8)
PLATELET # BLD AUTO: 175 K/UL (ref 150–400)
PMV BLD AUTO: 12.9 FL (ref 8.9–12.9)
POTASSIUM SERPL-SCNC: 4.4 MMOL/L (ref 3.5–5.1)
PROT SERPL-MCNC: 8.2 G/DL (ref 6.4–8.2)
PROT UR STRIP-MCNC: 30 MG/DL
RBC # BLD AUTO: 5.11 M/UL (ref 3.8–5.2)
RBC #/AREA URNS HPF: ABNORMAL /HPF (ref 0–5)
SODIUM SERPL-SCNC: 139 MMOL/L (ref 136–145)
SP GR UR REFRACTOMETRY: 1.02 (ref 1–1.03)
TRIGL SERPL-MCNC: 124 MG/DL (ref ?–150)
TSH SERPL DL<=0.05 MIU/L-ACNC: 1.61 UIU/ML (ref 0.36–3.74)
UROBILINOGEN UR QL STRIP.AUTO: 0.2 EU/DL (ref 0.2–1)
VLDLC SERPL CALC-MCNC: 24.8 MG/DL
WBC # BLD AUTO: 5.9 K/UL (ref 3.6–11)
WBC URNS QL MICRO: ABNORMAL /HPF (ref 0–4)

## 2021-02-02 RX ORDER — ROSUVASTATIN CALCIUM 40 MG/1
40 TABLET, COATED ORAL
Qty: 90 TAB | Refills: 3 | Status: SHIPPED | OUTPATIENT
Start: 2021-02-02

## 2022-08-12 NOTE — LETTER
Patient given Rx for glasses. [facility] EMERGENCY DEPT 
[address] [facility] [address] [id number] [phone] Work/School Note Date: 12/1/2017 To Whom It May concern: 
 
Adrienne Cadena was seen and treated today in the emergency room by the following provider(s): 
Attending Provider: Adriel Garsia MD 
Physician Assistant: Tre Beebe. Adrienne Cadena may return to work on 12/2/2017. Sincerely, SCOTTY Beebe

## 2023-03-21 ENCOUNTER — PATIENT MESSAGE (OUTPATIENT)
Dept: INTERNAL MEDICINE CLINIC | Age: 68
End: 2023-03-21

## 2023-03-21 ENCOUNTER — OFFICE VISIT (OUTPATIENT)
Dept: INTERNAL MEDICINE CLINIC | Age: 68
End: 2023-03-21
Payer: MEDICARE

## 2023-03-21 VITALS
HEART RATE: 73 BPM | DIASTOLIC BLOOD PRESSURE: 66 MMHG | TEMPERATURE: 98 F | RESPIRATION RATE: 18 BRPM | HEIGHT: 69 IN | OXYGEN SATURATION: 96 % | BODY MASS INDEX: 32.53 KG/M2 | WEIGHT: 219.6 LBS | SYSTOLIC BLOOD PRESSURE: 103 MMHG

## 2023-03-21 DIAGNOSIS — E04.1 THYROID NODULE: ICD-10-CM

## 2023-03-21 DIAGNOSIS — Z00.00 MEDICARE ANNUAL WELLNESS VISIT, SUBSEQUENT: Primary | ICD-10-CM

## 2023-03-21 DIAGNOSIS — E78.01 HETEROZYGOUS FAMILIAL HYPERCHOLESTEROLEMIA: ICD-10-CM

## 2023-03-21 DIAGNOSIS — I10 PRIMARY HYPERTENSION: ICD-10-CM

## 2023-03-21 DIAGNOSIS — E66.9 OBESITY (BMI 35.0-39.9 WITHOUT COMORBIDITY): ICD-10-CM

## 2023-03-21 DIAGNOSIS — Z12.11 SCREEN FOR COLON CANCER: ICD-10-CM

## 2023-03-21 DIAGNOSIS — Z12.31 ENCOUNTER FOR SCREENING MAMMOGRAM FOR MALIGNANT NEOPLASM OF BREAST: ICD-10-CM

## 2023-03-21 DIAGNOSIS — Z13.39 SCREENING FOR ALCOHOLISM: ICD-10-CM

## 2023-03-21 PROCEDURE — 3017F COLORECTAL CA SCREEN DOC REV: CPT | Performed by: INTERNAL MEDICINE

## 2023-03-21 PROCEDURE — 1123F ACP DISCUSS/DSCN MKR DOCD: CPT | Performed by: INTERNAL MEDICINE

## 2023-03-21 PROCEDURE — G9899 SCRN MAM PERF RSLTS DOC: HCPCS | Performed by: INTERNAL MEDICINE

## 2023-03-21 PROCEDURE — 1101F PT FALLS ASSESS-DOCD LE1/YR: CPT | Performed by: INTERNAL MEDICINE

## 2023-03-21 PROCEDURE — G8417 CALC BMI ABV UP PARAM F/U: HCPCS | Performed by: INTERNAL MEDICINE

## 2023-03-21 PROCEDURE — G8400 PT W/DXA NO RESULTS DOC: HCPCS | Performed by: INTERNAL MEDICINE

## 2023-03-21 PROCEDURE — G8427 DOCREV CUR MEDS BY ELIG CLIN: HCPCS | Performed by: INTERNAL MEDICINE

## 2023-03-21 PROCEDURE — G8536 NO DOC ELDER MAL SCRN: HCPCS | Performed by: INTERNAL MEDICINE

## 2023-03-21 PROCEDURE — 3074F SYST BP LT 130 MM HG: CPT | Performed by: INTERNAL MEDICINE

## 2023-03-21 PROCEDURE — 99213 OFFICE O/P EST LOW 20 MIN: CPT | Performed by: INTERNAL MEDICINE

## 2023-03-21 PROCEDURE — G0439 PPPS, SUBSEQ VISIT: HCPCS | Performed by: INTERNAL MEDICINE

## 2023-03-21 PROCEDURE — 1090F PRES/ABSN URINE INCON ASSESS: CPT | Performed by: INTERNAL MEDICINE

## 2023-03-21 PROCEDURE — 3078F DIAST BP <80 MM HG: CPT | Performed by: INTERNAL MEDICINE

## 2023-03-21 PROCEDURE — G8510 SCR DEP NEG, NO PLAN REQD: HCPCS | Performed by: INTERNAL MEDICINE

## 2023-03-21 RX ORDER — HYDROCHLOROTHIAZIDE 12.5 MG/1
12.5 TABLET ORAL DAILY
Qty: 90 TABLET | Refills: 3 | Status: SHIPPED | OUTPATIENT
Start: 2023-03-21

## 2023-03-21 RX ORDER — AMLODIPINE BESYLATE 5 MG/1
5 TABLET ORAL DAILY
Qty: 90 TABLET | Refills: 3 | Status: SHIPPED | OUTPATIENT
Start: 2023-03-21

## 2023-03-21 RX ORDER — ROSUVASTATIN CALCIUM 40 MG/1
40 TABLET, COATED ORAL
Qty: 90 TABLET | Refills: 3 | Status: SHIPPED | OUTPATIENT
Start: 2023-03-21

## 2023-03-21 NOTE — PROGRESS NOTES
SPORTS MEDICINE AND PRIMARY CARE  Mirtha Millan MD, [address] [address]  Phone:  [phone]  Fax: [phone]      Chief Complaint   Patient presents with    Hypertension    Complete Physical         SUBECTIVE:    Norman Alonzo is a 74 y.o. female Patient returns today with a known history of obesity, primary hypertension, heterozygous familial hypercholesterolemia and is seen for evaluation. Comes in today voicing no new complaints and comes in for a physical.  He has a known history of hypertension and dyslipidemia and is seen for evaluation. Current Outpatient Medications   Medication Sig Dispense Refill    rosuvastatin (CRESTOR) 40 mg tablet Take 1 Tablet by mouth nightly. 90 Tablet 3    amLODIPine (NORVASC) 5 mg tablet Take 1 Tablet by mouth daily. 90 Tablet 3    hydroCHLOROthiazide (HYDRODIURIL) 12.5 mg tablet Take 1 Tablet by mouth daily. 80 Tablet 3     Past Medical History:   Diagnosis Date    Encounter to [facility] care     Heterozygous familial hypercholesterolemia 02/01/2022    Hypertension     Obesity (BMI 35.0-39.9 without comorbidity)      Past Surgical History:   Procedure Laterality Date    HX GYN      Hysterectomy     Allergies   Allergen Reactions    Tylenol [Acetaminophen] Rash       REVIEW OF SYSTEMS:   No shortness of breath. Social History     Socioeconomic History    Marital status: SINGLE   Tobacco Use    Smoking status: Former     Years: 1.00     Types: Cigarettes    Smokeless tobacco: Never   Vaping Use    Vaping Use: Never used   Substance and Sexual Activity    Alcohol use: Yes     Comment: occasional    Drug use: No    Sexual activity: Yes     Partners: Male     Birth control/protection: Condom   Social History Narrative    Habits:  She is a lifetime nonsmoker, non drug abuser. Drinks one to two drinks a month. Social History:  The patient is . She lives alone. She completed high school.   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 She is the mother of four children, a daughter and three sons, ages 40-55, and nine grandchildren. [place]en preference is [place]. Family History:  Father  [de-identified], \"natural causes\". Mother  in her [de-identified] causing myocardial infarction. Three brothers and three sisters are alive and well.   r  Family History   Problem Relation Age of Onset    Asthma Mother     Elevated Lipids Sister     Hypertension Sister        OBJECTIVE:  Visit Vitals  /66 (BP 1 Location: Left upper arm, BP Patient Position: Sitting)   Pulse 73   Temp 98 °F (36.7 °C) (Oral)   Resp 18   Ht 5' 9\" (1.753 m)   Wt 219 lb 9.6 oz (99.6 kg)   SpO2 96%   BMI 32.43 kg/m²     ENT: perrla,  eom intact  NECK: supple. Thyroid normal  CHEST: clear to ascultation and percussion   HEART: regular rate and rhythm  ABD: soft, bowel sounds active  EXTREMITIES: no edema, pulse 1+     No visits with results within 3 Month(s) from this visit. Latest known visit with results is:   Office Visit on 2021   Component Date Value Ref Range Status    Hemoglobin A1c 2021 6.0 (A)  4.0 - 5.6 % Final    Comment: NEW METHOD PLEASE NOTE NEW REFERENCE RANGE  (NOTE)  HbA1C Interpretive Ranges  <5.7              Normal  5.7 - 6.4         Consider Prediabetes  >6.5              Consider Diabetes      Est. average glucose 2021 126  mg/dL Final    TSH 2021 1.61  0.36 - 3.74 uIU/mL Final    Comment:   Due to TSH heterogeneity, both structurally and degree of glycosylation,  monoclonal antibodies used in the TSH assay may not accurately quantitate TSH. Therefore, this result should be correlated with clinical findings as well as  with other assessments of thyroid function, e.g., free T4, free T3.       LIPID PROFILE 2021        Final    Cholesterol, total 2021 281 (A)  <200 MG/DL Final    Triglyceride 2021 124  <150 MG/DL Final    Comment: Based on NCEP-ATP III:  Triglycerides <150 mg/dL  is considered normal, 150-199  mg/dL  borderline high,  200-499 mg/dL high and  greater than or equal to 500  mg/dL very high. HDL Cholesterol 02/01/2021 60  MG/DL Final    Comment: Based on NCEP ATP III, HDL Cholesterol <40 mg/dL is considered low and >60  mg/dL is elevated. LDL, calculated 02/01/2021 196.2 (A)  0 - 100 MG/DL Final    Comment: Based on the NCEP-ATP: LDL-C concentrations are considered  optimal <100 mg/dL,  near optimal/above Normal 100-129 mg/dL Borderline High: 130-159, High: 160-189  mg/dL Very High: Greater than or equal to 190 mg/dL      VLDL, calculated 02/01/2021 24.8  MG/DL Final    CHOL/HDL Ratio 02/01/2021 4.7  0.0 - 5.0   Final    Sodium 02/01/2021 139  136 - 145 mmol/L Final    Potassium 02/01/2021 4.4  3.5 - 5.1 mmol/L Final    Chloride 02/01/2021 105  97 - 108 mmol/L Final    CO2 02/01/2021 28  21 - 32 mmol/L Final    Anion gap 02/01/2021 6  5 - 15 mmol/L Final    Glucose 02/01/2021 70  65 - 100 mg/dL Final    BUN 02/01/2021 9  6 - 20 MG/DL Final    Creatinine 02/01/2021 0.82  0.55 - 1.02 MG/DL Final    BUN/Creatinine ratio 02/01/2021 11 (A)  12 - 20   Final    GFR est AA 02/01/2021 >60  >60 ml/min/1.73m2 Final    GFR est non-AA 02/01/2021 >60  >60 ml/min/1.73m2 Final    Comment: Estimated GFR is calculated using the IDMS-traceable Modification of Diet in  Renal Disease (MDRD) Study equation, reported for both  Americans  (GFRAA) and non- Americans (GFRNA), and normalized to 1.73m2 body  surface area. The physician must decide which value applies to the patient. Calcium 02/01/2021 10.7 (A)  8.5 - 10.1 MG/DL Final    Bilirubin, total 02/01/2021 0.3  0.2 - 1.0 MG/DL Final    ALT (SGPT) 02/01/2021 23  12 - 78 U/L Final    AST (SGOT) 02/01/2021 15  15 - 37 U/L Final    Alk.  phosphatase 02/01/2021 118 (A)  45 - 117 U/L Final    Protein, total 02/01/2021 8.2  6.4 - 8.2 g/dL Final    Albumin 02/01/2021 4.2  3.5 - 5.0 g/dL Final Globulin 02/01/2021 4.0  2.0 - 4.0 g/dL Final    A-G Ratio 02/01/2021 1.1  1.1 - 2.2   Final    WBC 02/01/2021 5.9  3.6 - 11.0 K/uL Final    RBC 02/01/2021 5.11  3.80 - 5.20 M/uL Final    HGB 02/01/2021 15.4  11.5 - 16.0 g/dL Final    HCT 02/01/2021 47.8 (A)  35.0 - 47.0 % Final    MCV 02/01/2021 93.5  80.0 - 99.0 FL Final    MCH 02/01/2021 30.1  26.0 - 34.0 PG Final    MCHC 02/01/2021 32.2  30.0 - 36.5 g/dL Final    RDW 02/01/2021 14.5  11.5 - 14.5 % Final    PLATELET 98/05/9288 243  150 - 400 K/uL Final    MPV 02/01/2021 12.9  8.9 - 12.9 FL Final    NRBC 02/01/2021 0.0  0  WBC Final    ABSOLUTE NRBC 02/01/2021 0.00  0.00 - 0.01 K/uL Final    NEUTROPHILS 02/01/2021 44  32 - 75 % Final    LYMPHOCYTES 02/01/2021 46  12 - 49 % Final    MONOCYTES 02/01/2021 8  5 - 13 % Final    EOSINOPHILS 02/01/2021 1  0 - 7 % Final    BASOPHILS 02/01/2021 0  0 - 1 % Final    IMMATURE GRANULOCYTES 02/01/2021 0  0.0 - 0.5 % Final    ABS. NEUTROPHILS 02/01/2021 2.6  1.8 - 8.0 K/UL Final    ABS. LYMPHOCYTES 02/01/2021 2.7  0.8 - 3.5 K/UL Final    ABS. MONOCYTES 02/01/2021 0.5  0.0 - 1.0 K/UL Final    ABS. EOSINOPHILS 02/01/2021 0.1  0.0 - 0.4 K/UL Final    ABS. BASOPHILS 02/01/2021 0.0  0.0 - 0.1 K/UL Final    ABS. IMM.  GRANS. 02/01/2021 0.0  0.00 - 0.04 K/UL Final    DF 02/01/2021 AUTOMATED    Final    Color 02/01/2021 YELLOW/STRAW    Final    Color Reference Range: Straw, Yellow or Dark Yellow    Appearance 02/01/2021 TURBID (A)  CLEAR   Final    Specific gravity 02/01/2021 1.023  1.003 - 1.030   Final    pH (UA) 02/01/2021 5.0  5.0 - 8.0   Final    Protein 02/01/2021 30 (A)  Negative mg/dL Final    Glucose 02/01/2021 Negative  Negative mg/dL Final    Ketone 02/01/2021 TRACE (A)  Negative mg/dL Final    Bilirubin 02/01/2021 Negative  Negative   Final    Blood 02/01/2021 SMALL (A)  Negative   Final    Urobilinogen 02/01/2021 0.2  0.2 - 1.0 EU/dL Final    Nitrites 02/01/2021 Negative  Negative   Final    Leukocyte Esterase 02/01/2021 SMALL (A)  Negative   Final    WBC 02/01/2021 5-10  0 - 4 /hpf Final    RBC 02/01/2021 0-5  0 - 5 /hpf Final    Epithelial cells 02/01/2021 MANY (A)  FEW /lpf Final    Bacteria 02/01/2021 Negative  Negative /hpf Final    Amorphous Crystals 02/01/2021 3+ (A)  Negative Final    CA Oxalate crystals 02/01/2021 FEW (A)  Negative   Final          ASSESSMENT:  1. Medicare annual wellness visit, subsequent    2. Screening for alcoholism    3. Obesity (BMI 35.0-39.9 without comorbidity)    4. Primary hypertension    5. Heterozygous familial hypercholesterolemia    6. Screen for colon cancer    7. Encounter for screening mammogram for malignant neoplasm of breast     8. Thyroid nodule      Patient's medical status is stable. We remain concerned about her weight and to that end we encourage a heart healthy, weight reducing diet and physical activity for at least 30 minutes five days a week. Blood pressure control is at goal.  She is on Amlodipine 5. The Micardis was discontinued. She continues with Hydrochlorothiazide. She has heterozygous familial hypercholesterolemia, on Rosuvastatin and Zetia. We will check her lipid panel to determine the progress and make a decision if we need to add Repatha. She will be back to see me in one year, sooner if she has any problems. I have discussed the diagnosis with the patient and the intended plan as seen in the  orders above. The patient understands and agees with the plan. The patient has   received an after visit summary and questions were answered concerning  future plans  Patient labs and/or xrays were reviewed  Past records were reviewed.     PLAN:  .  Orders Placed This Encounter    MU MAMMO BI SCREENING INCL CAD    US THYROID/PARATHYROID/SOFT TISS    URINALYSIS W/ RFLX MICROSCOPIC    CBC WITH AUTOMATED DIFF    METABOLIC PANEL, COMPREHENSIVE    LIPID PANEL    TSH 3RD GENERATION    [facility]    rosuvastatin (CRESTOR) 40 mg tablet    amLODIPine (NORVASC) 5 mg tablet    hydroCHLOROthiazide (HYDRODIURIL) 12.5 mg tablet       Follow-up and Dispositions    Return in about 1 year (around 3/21/2024). ATTENTION:   This medical record was transcribed using an electronic medical records system. Although proofread, it may and can contain electronic and spelling errors. Other human spelling and other errors may be present. Corrections may be executed at a later time. Please feel free to contact us for any clarifications as needed.

## 2023-03-21 NOTE — PATIENT INSTRUCTIONS
Medicare Wellness Visit, Female     The best way to live healthy is to have a lifestyle where you eat a well-balanced diet, exercise regularly, limit alcohol use, and quit all forms of tobacco/nicotine, if applicable. Regular preventive services are another way to keep healthy. Preventive services (vaccines, screening tests, monitoring & exams) can help personalize your care plan, which helps you manage your own care. Screening tests can find health problems at the earliest stages, when they are easiest to treat. [facility] follows the current, evidence-based guidelines published by the [facility] ([facility]) when recommending preventive services for our patients. Because we follow these guidelines, sometimes recommendations change over time as research supports it. (For example, mammograms used to be recommended annually. Even though Medicare will still pay for an annual mammogram, the newer guidelines recommend a mammogram every two years for women of average risk). Of course, you and your doctor may decide to screen more often for some diseases, based on your risk and your co-morbidities (chronic disease you are already diagnosed with). Preventive services for you include:  - Medicare offers their members a free annual wellness visit, which is time for you and your primary care provider to discuss and plan for your preventive service needs.  Take advantage of this benefit every year!    -Over the age of 72 should receive the recommended pneumonia vaccines.    -All adults should have a flu vaccine yearly.  -All adults should have a tetanus vaccine every 10 years.   -Over the age 48 should receive the shingles vaccines.        -All adults should be screened once for Hepatitis C.  -All adults age 38-68 who are overweight should have a diabetes screening test once every three years.   -Other screening tests and preventive services for persons with diabetes include: an eye exam to screen for diabetic retinopathy, a kidney function test, a foot exam, and stricter control over your cholesterol.   -Cardiovascular screening for adults with routine risk involves an electrocardiogram (ECG) at intervals determined by your doctor.     -Colorectal cancer screenings should be done for adults age 39-70 with no increased risk factors for colorectal cancer. There are a number of acceptable methods of screening for this type of cancer. Each test has its own benefits and drawbacks. Discuss with your doctor what is most appropriate for you during your annual wellness visit. The different tests include: colonoscopy (considered the best screening method), a fecal occult blood test, a fecal DNA test, and sigmoidoscopy.    -Lung cancer screening is recommended annually with a low dose CT scan for adults between age 54 and 68, who have smoked at least 30 pack years (equivalent of 1 pack per day for 30 days), and who is a current smoker or quit less than 15 years ago.    -A bone mass density test is recommended when a woman turns 65 to screen for osteoporosis. This test is only recommended one time, as a screening. Some providers will use this same test as a disease monitoring tool if you already have osteoporosis. -Breast cancer screenings are recommended every other year for women of normal risk, age 54-69.    -Cervical cancer screenings for women over age 72 are only recommended with certain risk factors.      Here is a list of your current Health Maintenance items (your personalized list of preventive services) with a due date:  Health Maintenance Due   Topic Date Due    Hepatitis C Test  Never done    COVID-19 Vaccine (1) Never done    DTaP/Tdap/Td  (1 - Tdap) Never done    Cervical cancer screen  Never done    Colorectal Screening  Never done    Shingles Vaccine (1 of 2) Never done    Mammogram  03/21/2018    Cholesterol Test   02/01/2022    Yearly Flu Vaccine (1) Never done    Bone Mineral Density   Never done    Pneumococcal Vaccine (1 - PCV) Never done

## 2023-03-21 NOTE — PROGRESS NOTES
Rocio De Souza is a 74 y.o. female    Chief Complaint   Patient presents with    Hypertension    Complete Physical     1. Have you been to the ER, urgent care clinic since your last visit? Hospitalized since your last visit? No  2. Have you seen or consulted any other health care providers outside of the [address] [place] [address] since your last visit? Include any pap smears or colon screening. No  This is the Subsequent Medicare Annual Wellness Exam, performed 12 months or more after the Initial AWV or the last Subsequent AWV    I have reviewed the patient's medical history in detail and updated the computerized patient record. Assessment/Plan   Education and counseling provided:  Are appropriate based on today's review and evaluation    1. Medicare annual wellness visit, subsequent  2.  Screening for alcoholism     Depression Risk Factor Screening     3 most recent PHQ Screens 3/21/2023   Little interest or pleasure in doing things Not at all   Feeling down, depressed, irritable, or hopeless Not at all   Total Score PHQ 2 0   Trouble falling or staying asleep, or sleeping too much Not at all   Feeling tired or having little energy Not at all   Poor appetite, weight loss, or overeating Not at all   Feeling bad about yourself - or that you are a failure or have let yourself or your family down Not at all   Trouble concentrating on things such as school, work, reading, or watching TV Not at all   Moving or speaking so slowly that other people could have noticed; or the opposite being so fidgety that others notice Not at all   Thoughts of being better off dead, or hurting yourself in some way Not at all   PHQ 9 Score 0   How difficult have these problems made it for you to do your work, take care of your home and get along with others Not difficult at all       Alcohol & Drug Abuse Risk Screen    Do you average more than 1 drink per night or more than 7 drinks a week:  No    On any one occasion in the past three months have you have had more than 3 drinks containing alcohol:  Yes          Functional Ability and Level of Safety    Hearing: Hearing is good. Activities of Daily Living: The home contains: no safety equipment. Patient does total self care      Ambulation: with no difficulty     Fall Risk:  Fall Risk Assessment, last 12 mths 3/21/2023   Able to walk? Yes   Fall in past 12 months? 0   Do you feel unsteady? 0   Are you worried about falling 0      Abuse Screen:  Patient is not abused       Cognitive Screening    Has your family/caregiver stated any concerns about your memory: no     Cognitive Screening: Normal - Verbal Fluency Test    Health Maintenance Due     Health Maintenance Due   Topic Date Due    Hepatitis C Screening  Never done    COVID-19 Vaccine (1) Never done    DTaP/Tdap/Td series (1 - Tdap) Never done    Cervical cancer screen  Never done    Colorectal Cancer Screening Combo  Never done    Shingles Vaccine (1 of 2) Never done    Breast Cancer Screen Mammogram  03/21/2018    Lipid Screen  02/01/2022    Flu Vaccine (1) Never done    Bone Densitometry (Dexa) Screening  Never done    Pneumococcal 65+ years (1 - PCV) Never done       Patient Care Team   Patient Care Team:  Chetan Hendrix MD as PCP - General (Internal Medicine Physician)  Chetan Hendrix MD as PCP [facility] Empaneled Provider    History     Patient Active Problem List   Diagnosis Code    Hypertension I10    Hypercholesterolemia E78.00    Encounter to establish care Z76.89    Obesity (BMI 35.0-39.9 without comorbidity) E66.9     Past Medical History:   Diagnosis Date    Encounter to establish care     Hypercholesterolemia     Hypertension     Obesity (BMI 35.0-39.9 without comorbidity)       Past Surgical History:   Procedure Laterality Date    HX GYN      Hysterectomy     Current Outpatient Medications   Medication Sig Dispense Refill    rosuvastatin (CRESTOR) 40 mg tablet Take 1 Tab by mouth nightly.  90 Tab 3 telmisartan-hydroCHLOROthiazide (MICARDIS HCT) 80-12.5 mg per tablet Take 1 Tab by mouth daily. 30 Tab 11    meclizine (ANTIVERT) 25 mg tablet Take 1 Tab by mouth every six (6) hours as needed.  20 Tab 0     Allergies   Allergen Reactions    Tylenol [Acetaminophen] Rash       Family History   Problem Relation Age of Onset    Asthma Mother     Elevated Lipids Sister     Hypertension Sister      Social History     Tobacco Use    Smoking status: Former     Years: 1.00     Types: Cigarettes    Smokeless tobacco: Never   Substance Use Topics    Alcohol use: Yes     Comment: occasional         Opal Lopez MA

## 2023-03-22 LAB
ALBUMIN SERPL-MCNC: 4.2 G/DL (ref 3.5–5)
ALBUMIN/GLOB SERPL: 1.1 (ref 1.1–2.2)
ALP SERPL-CCNC: 89 U/L (ref 45–117)
ALT SERPL-CCNC: 22 U/L (ref 12–78)
ANION GAP SERPL CALC-SCNC: 2 MMOL/L (ref 5–15)
APPEARANCE UR: ABNORMAL
AST SERPL-CCNC: 19 U/L (ref 15–37)
BACTERIA URNS QL MICRO: NEGATIVE /HPF
BASOPHILS # BLD: 0 K/UL (ref 0–0.1)
BASOPHILS NFR BLD: 0 % (ref 0–1)
BILIRUB SERPL-MCNC: 0.5 MG/DL (ref 0.2–1)
BILIRUB UR QL: NEGATIVE
BUN SERPL-MCNC: 15 MG/DL (ref 6–20)
BUN/CREAT SERPL: 17 (ref 12–20)
CALCIUM SERPL-MCNC: 11.1 MG/DL (ref 8.5–10.1)
CHLORIDE SERPL-SCNC: 104 MMOL/L (ref 97–108)
CHOLEST SERPL-MCNC: 216 MG/DL
CO2 SERPL-SCNC: 32 MMOL/L (ref 21–32)
COLOR UR: ABNORMAL
CREAT SERPL-MCNC: 0.86 MG/DL (ref 0.55–1.02)
DIFFERENTIAL METHOD BLD: ABNORMAL
EOSINOPHIL # BLD: 0.1 K/UL (ref 0–0.4)
EOSINOPHIL NFR BLD: 1 % (ref 0–7)
EPITH CASTS URNS QL MICRO: ABNORMAL /LPF
ERYTHROCYTE [DISTWIDTH] IN BLOOD BY AUTOMATED COUNT: 15.2 % (ref 11.5–14.5)
GLOBULIN SER CALC-MCNC: 4 G/DL (ref 2–4)
GLUCOSE SERPL-MCNC: 84 MG/DL (ref 65–100)
GLUCOSE UR STRIP.AUTO-MCNC: NEGATIVE MG/DL
HCT VFR BLD AUTO: 47.3 % (ref 35–47)
HDLC SERPL-MCNC: 65 MG/DL
HDLC SERPL: 3.3 (ref 0–5)
HGB BLD-MCNC: 14.6 G/DL (ref 11.5–16)
HGB UR QL STRIP: ABNORMAL
HYALINE CASTS URNS QL MICRO: ABNORMAL /LPF (ref 0–5)
IMM GRANULOCYTES # BLD AUTO: 0 K/UL (ref 0–0.04)
IMM GRANULOCYTES NFR BLD AUTO: 0 % (ref 0–0.5)
KETONES UR QL STRIP.AUTO: NEGATIVE MG/DL
LDLC SERPL CALC-MCNC: 140.2 MG/DL (ref 0–100)
LEUKOCYTE ESTERASE UR QL STRIP.AUTO: NEGATIVE
LYMPHOCYTES # BLD: 3.4 K/UL (ref 0.8–3.5)
LYMPHOCYTES NFR BLD: 54 % (ref 12–49)
MCH RBC QN AUTO: 29 PG (ref 26–34)
MCHC RBC AUTO-ENTMCNC: 30.9 G/DL (ref 30–36.5)
MCV RBC AUTO: 94 FL (ref 80–99)
MONOCYTES # BLD: 0.4 K/UL (ref 0–1)
MONOCYTES NFR BLD: 7 % (ref 5–13)
NEUTS SEG # BLD: 2.3 K/UL (ref 1.8–8)
NEUTS SEG NFR BLD: 38 % (ref 32–75)
NITRITE UR QL STRIP.AUTO: NEGATIVE
NRBC # BLD: 0 K/UL (ref 0–0.01)
NRBC BLD-RTO: 0 PER 100 WBC
PH UR STRIP: 5 (ref 5–8)
PLATELET # BLD AUTO: 182 K/UL (ref 150–400)
PMV BLD AUTO: 12.7 FL (ref 8.9–12.9)
POTASSIUM SERPL-SCNC: 4 MMOL/L (ref 3.5–5.1)
PROT SERPL-MCNC: 8.2 G/DL (ref 6.4–8.2)
PROT UR STRIP-MCNC: NEGATIVE MG/DL
RBC # BLD AUTO: 5.03 M/UL (ref 3.8–5.2)
RBC #/AREA URNS HPF: ABNORMAL /HPF (ref 0–5)
SODIUM SERPL-SCNC: 138 MMOL/L (ref 136–145)
SP GR UR REFRACTOMETRY: 1.02 (ref 1–1.03)
TRIGL SERPL-MCNC: 54 MG/DL (ref ?–150)
TSH SERPL DL<=0.05 MIU/L-ACNC: 1.6 UIU/ML (ref 0.36–3.74)
UROBILINOGEN UR QL STRIP.AUTO: 0.2 EU/DL (ref 0.2–1)
VLDLC SERPL CALC-MCNC: 10.8 MG/DL
WBC # BLD AUTO: 6.2 K/UL (ref 3.6–11)
WBC URNS QL MICRO: ABNORMAL /HPF (ref 0–4)

## 2023-03-22 RX ORDER — EZETIMIBE 10 MG/1
10 TABLET ORAL DAILY
Qty: 90 TABLET | Refills: 3 | Status: SHIPPED | OUTPATIENT
Start: 2023-03-22

## 2023-03-22 NOTE — PROGRESS NOTES
Your cholesterol is not at goal and therefore I have added Zetia to your rosuvastatin. Continue to adhere to a heart healthy or Mediterranean diet. Total Cholesterol is the total number of cholesterol particles in your blood. Goal is less than 200. Triglycerides are the short term fats in your blood. Goal is less than 150. HDL is the good cholesterol in your blood. Goal is more than 50. LDL is the bad cholesterol in your blood. Goal is less than 100. Continue to follow a low cholesterol diet. Try to limit the amount of fried foods, fatty foods, butter, gravy, red meat, ice cream, cheese, and eggs in your diet, which are all high in cholesterol. Take all of your medications as directed. Your \"Bad\" cholesterol (LDL and/or triglycerides) are elevated.  Please eat a healthier diet as described below.  In particular avoid fried, fatty and junk foods, while increasing fiber (fruits and vegetables).  If you cannot increase fiber through diet, you can supplement with metamucil as directed on bottle daily.  Also, make sure you are taking 1 to 2 grams of over the counter fish oil.  Increase exercise to 5 times per week of cardio lasting at least 30 min's each (biking, walking, elliptical, swimming).       Mediterranean diet: Choose this heart-healthy diet option  The Mediterranean diet is a heart-healthy eating plan combining elements of Mediterranean-style cooking. Here's how to adopt the Mediterranean diet.      If you're looking for a heart-healthy eating plan, the Mediterranean diet might be right for you. The Mediterranean diet incorporates the basics of healthy eating - plus a splash of flavorful olive oil and perhaps a glass of red wine - among other components characterizing the traditional cooking style of countries bordering [facility]. Most healthy diets include fruits, vegetables, fish and whole grains, and limit unhealthy fats.  While these parts of a healthy diet remain tried-and-true, subtle variations or differences in proportions of certain foods may make a difference in your risk of heart disease. Benefits of the [address] has shown that the traditional Mediterranean diet reduces the risk of heart disease. In fact, a recent analysis of more than 1.5 million healthy adults demonstrated that following a Mediterranean diet was associated with a reduced risk of overall and cardiovascular mortality, a reduced incidence of cancer and cancer mortality, and a reduced incidence of Parkinson's and Alzheimer's diseases. For this reason, most if not all major scientific organizations encourage healthy adults to adapt a style of eating like that of the [address] [address]  for prevention of major chronic diseases.         Key components of the Mediterranean diet  The Mediterranean diet emphasizes:   Getting plenty of exercise   Eating primarily plant-based foods, such as fruits and vegetables, whole grains, legumes and nuts   Replacing butter with healthy fats such as olive oil and canola oil   Using herbs and spices instead of salt to flavor foods   Limiting red meat to no more than a few times a month   Eating fish and poultry at least twice a week   Drinking red wine in moderation (optional)   The diet also recognizes the importance of enjoying meals with family and friends. Fruits, vegetables, nuts and grains  The Mediterranean diet traditionally includes fruits, vegetables, pasta and rice. For example, residents of [facility] eat very little red meat and average nine servings a day of antioxidant-rich fruits and vegetables. The Mediterranean diet has been associated with a lower level of oxidized low-density lipoprotein (LDL) cholesterol - the \"bad\" cholesterol that's more likely to build up deposits in your arteries. Nuts are another part of a healthy Mediterranean diet.  Nuts are high in fat (approximately 80 percent of their calories come from fat), but most of the fat is not saturated. Because nuts are high in calories, they should not be eaten in large amounts - generally no more than a handful a day.  For the best nutrition, avoid candied or honey-roasted and heavily salted nuts

## 2023-04-23 DIAGNOSIS — I10 PRIMARY HYPERTENSION: Primary | ICD-10-CM

## 2023-04-23 DIAGNOSIS — Z12.31 ENCOUNTER FOR SCREENING MAMMOGRAM FOR MALIGNANT NEOPLASM OF BREAST: ICD-10-CM

## 2023-04-24 DIAGNOSIS — I10 PRIMARY HYPERTENSION: Primary | ICD-10-CM

## 2023-04-24 DIAGNOSIS — Z12.31 ENCOUNTER FOR SCREENING MAMMOGRAM FOR MALIGNANT NEOPLASM OF BREAST: ICD-10-CM

## 2023-05-10 ENCOUNTER — HOSPITAL ENCOUNTER (OUTPATIENT)
Facility: HOSPITAL | Age: 68
Discharge: HOME | End: 2023-05-13
Payer: MEDICAID

## 2023-05-10 DIAGNOSIS — I10 PRIMARY HYPERTENSION: ICD-10-CM

## 2023-05-10 DIAGNOSIS — Z12.31 ENCOUNTER FOR SCREENING MAMMOGRAM FOR MALIGNANT NEOPLASM OF BREAST: ICD-10-CM

## 2023-05-10 DIAGNOSIS — N60.42 MAMMARY DUCT ECTASIA OF LEFT BREAST: Primary | ICD-10-CM

## 2023-05-10 DIAGNOSIS — E04.1 THYROID NODULE: ICD-10-CM

## 2023-05-10 PROCEDURE — 76536 US EXAM OF HEAD AND NECK: CPT

## 2023-05-10 PROCEDURE — 77063 BREAST TOMOSYNTHESIS BI: CPT

## 2023-05-12 ENCOUNTER — TRANSCRIBE ORDERS (OUTPATIENT)
Facility: HOSPITAL | Age: 68
End: 2023-05-12

## 2023-05-12 DIAGNOSIS — R92.8 ABNORMAL MAMMOGRAM OF LEFT BREAST: Primary | ICD-10-CM

## 2023-05-15 DIAGNOSIS — E04.1 THYROID NODULE: Primary | ICD-10-CM

## 2023-05-18 ENCOUNTER — HOSPITAL ENCOUNTER (OUTPATIENT)
Facility: HOSPITAL | Age: 68
End: 2023-05-18
Payer: MEDICARE

## 2023-05-18 ENCOUNTER — HOSPITAL ENCOUNTER (OUTPATIENT)
Facility: HOSPITAL | Age: 68
Discharge: HOME | End: 2023-05-18
Payer: MEDICARE

## 2023-05-18 DIAGNOSIS — R92.8 ABNORMAL MAMMOGRAM OF LEFT BREAST: ICD-10-CM

## 2023-05-18 DIAGNOSIS — N60.42 MAMMARY DUCT ECTASIA OF LEFT BREAST: ICD-10-CM

## 2023-05-18 PROCEDURE — G0279 TOMOSYNTHESIS, MAMMO: HCPCS

## 2023-05-18 PROCEDURE — 76642 ULTRASOUND BREAST LIMITED: CPT

## 2023-05-24 RX ORDER — AMLODIPINE BESYLATE 5 MG/1
5 TABLET ORAL DAILY
COMMUNITY
Start: 2023-03-21 | End: 2023-06-16 | Stop reason: SDUPTHER

## 2023-05-24 RX ORDER — ROSUVASTATIN CALCIUM 40 MG/1
1 TABLET, COATED ORAL NIGHTLY
COMMUNITY
Start: 2023-03-21 | End: 2023-06-16 | Stop reason: SDUPTHER

## 2023-05-24 RX ORDER — EZETIMIBE 10 MG/1
10 TABLET ORAL DAILY
COMMUNITY
Start: 2023-03-22 | End: 2023-06-16 | Stop reason: SDUPTHER

## 2023-05-24 RX ORDER — HYDROCHLOROTHIAZIDE 12.5 MG/1
12.5 TABLET ORAL DAILY
COMMUNITY
Start: 2023-03-21 | End: 2023-06-16 | Stop reason: SDUPTHER

## 2023-06-22 ENCOUNTER — HOSPITAL ENCOUNTER (OUTPATIENT)
Facility: HOSPITAL | Age: 68
Discharge: HOME | End: 2023-06-22
Attending: INTERNAL MEDICINE
Payer: MEDICARE

## 2023-06-22 DIAGNOSIS — E04.1 THYROID NODULE: ICD-10-CM

## 2023-06-22 PROCEDURE — 76536 US EXAM OF HEAD AND NECK: CPT

## 2023-06-22 RX ORDER — LIDOCAINE HYDROCHLORIDE 10 MG/ML
10 INJECTION, SOLUTION EPIDURAL; INFILTRATION; INTRACAUDAL; PERINEURAL ONCE
Status: DISCONTINUED | OUTPATIENT
Start: 2023-06-22 | End: 2023-06-22

## 2023-11-07 ENCOUNTER — TELEPHONE (OUTPATIENT)
Dept: PHARMACY | Facility: CLINIC | Age: 68
End: 2023-11-07

## 2023-11-07 NOTE — TELEPHONE ENCOUNTER
[facility] CLINICAL PHARMACY: ADHERENCE REVIEW  Identified care gap per [employer]: fills at [facility]: Statin adherenc    [address] Records claims through  10.23.23  (Prior Year  [address] = not reported; YTD  [address] = 77%; Potential Fail Date: 23):   ROSUVASTATIN TAB 40 MG last filled on 23 for 90 day supply. Next refill due: 23 -PAST DUE 51 DAYS     Prescribed si tablet/capsule daily    Per Insurer Portal: last filled on 23 for 90 day supply. Per [facility]: last picked up on 23 for 90 day supply. will get 90 day supply ready to  since past due. Billed through [facility] ([facility]). 2 refills remaining. Lab Results   Component Value Date    CHOL 152 2023    TRIG 53 2023    HDL 56 2023    LDLCALC 85.4 2023     ALT   Date Value Ref Range Status   2023 22 12 - 78 U/L Final     AST   Date Value Ref Range Status   2023 19 15 - 37 U/L Final     The 10-year ASCVD risk score (Asif DK, et al., 2019) is: 5.9%    Values used to calculate the score:      Age: 79 years      Sex: Female      Is Non- : Yes      Diabetic: No      Tobacco smoker: No      Systolic Blood Pressure: 420 mmHg      Is BP treated: Yes      HDL Cholesterol: 56 MG/DL      Total Cholesterol: 152 MG/DL     PLAN    Per insurer report, LIS-2 - may be able to receive up to a 100-day supply for the same cost as a 30-day supply    The following are interventions that have been identified:   Patient overdue refilling ROSUVASTATIN TAB 40 MG and active on home medication list.   Refill/s of ROSUVASTATIN TAB 40 MG READY to  at patient's [address] for a $0 co pay. Reached patient to review. Spoke to Alexis who states she is taking ROSUVASTATIN TAB 40 MG, 1 tablet daily. She is doing good on the medication and isn't experiencing any side effects. She is running low on tablets.  [facility]

## 2024-05-14 RX ORDER — PREDNISONE 20 MG/1
20 TABLET ORAL 3 TIMES DAILY
Qty: 21 TABLET | Refills: 0 | Status: SHIPPED | OUTPATIENT
Start: 2024-05-14 | End: 2024-05-21

## 2024-06-28 RX ORDER — EZETIMIBE 10 MG/1
10 TABLET ORAL DAILY
Qty: 90 TABLET | OUTPATIENT
Start: 2024-06-28

## 2024-06-28 RX ORDER — EZETIMIBE 10 MG/1
10 TABLET ORAL DAILY
Qty: 60 TABLET | Refills: 0 | Status: SHIPPED | OUTPATIENT
Start: 2024-06-28

## 2024-07-12 SDOH — ECONOMIC STABILITY: HOUSING INSECURITY
IN THE LAST 12 MONTHS, WAS THERE A TIME WHEN YOU DID NOT HAVE A STEADY PLACE TO SLEEP OR SLEPT IN A SHELTER (INCLUDING NOW)?: NO

## 2024-07-12 SDOH — ECONOMIC STABILITY: FOOD INSECURITY: WITHIN THE PAST 12 MONTHS, THE FOOD YOU BOUGHT JUST DIDN'T LAST AND YOU DIDN'T HAVE MONEY TO GET MORE.: NEVER TRUE

## 2024-07-12 SDOH — ECONOMIC STABILITY: FOOD INSECURITY: WITHIN THE PAST 12 MONTHS, YOU WORRIED THAT YOUR FOOD WOULD RUN OUT BEFORE YOU GOT MONEY TO BUY MORE.: NEVER TRUE

## 2024-07-12 SDOH — ECONOMIC STABILITY: INCOME INSECURITY: HOW HARD IS IT FOR YOU TO PAY FOR THE VERY BASICS LIKE FOOD, HOUSING, MEDICAL CARE, AND HEATING?: NOT HARD AT ALL

## 2024-07-12 SDOH — ECONOMIC STABILITY: TRANSPORTATION INSECURITY
IN THE PAST 12 MONTHS, HAS LACK OF TRANSPORTATION KEPT YOU FROM MEETINGS, WORK, OR FROM GETTING THINGS NEEDED FOR DAILY LIVING?: NO

## 2024-07-15 ENCOUNTER — OFFICE VISIT (OUTPATIENT)
Facility: CLINIC | Age: 69
End: 2024-07-15
Payer: MEDICARE

## 2024-07-15 VITALS
SYSTOLIC BLOOD PRESSURE: 117 MMHG | DIASTOLIC BLOOD PRESSURE: 80 MMHG | RESPIRATION RATE: 18 BRPM | HEIGHT: 69 IN | TEMPERATURE: 97.9 F | HEART RATE: 80 BPM | BODY MASS INDEX: 33.49 KG/M2 | OXYGEN SATURATION: 93 % | WEIGHT: 226.1 LBS

## 2024-07-15 DIAGNOSIS — E78.01 HETEROZYGOUS FAMILIAL HYPERCHOLESTEROLEMIA: ICD-10-CM

## 2024-07-15 DIAGNOSIS — R73.02 IGT (IMPAIRED GLUCOSE TOLERANCE): ICD-10-CM

## 2024-07-15 DIAGNOSIS — Z12.31 ENCOUNTER FOR SCREENING MAMMOGRAM FOR MALIGNANT NEOPLASM OF BREAST: ICD-10-CM

## 2024-07-15 DIAGNOSIS — Z12.11 SCREEN FOR COLON CANCER: ICD-10-CM

## 2024-07-15 DIAGNOSIS — E04.2 MULTINODULAR GOITER: ICD-10-CM

## 2024-07-15 DIAGNOSIS — Z00.00 MEDICARE ANNUAL WELLNESS VISIT, SUBSEQUENT: Primary | ICD-10-CM

## 2024-07-15 DIAGNOSIS — E66.9 OBESITY (BMI 30.0-34.9): ICD-10-CM

## 2024-07-15 DIAGNOSIS — I10 PRIMARY HYPERTENSION: ICD-10-CM

## 2024-07-15 PROCEDURE — 3074F SYST BP LT 130 MM HG: CPT | Performed by: INTERNAL MEDICINE

## 2024-07-15 PROCEDURE — 1036F TOBACCO NON-USER: CPT | Performed by: INTERNAL MEDICINE

## 2024-07-15 PROCEDURE — G8400 PT W/DXA NO RESULTS DOC: HCPCS | Performed by: INTERNAL MEDICINE

## 2024-07-15 PROCEDURE — 99214 OFFICE O/P EST MOD 30 MIN: CPT | Performed by: INTERNAL MEDICINE

## 2024-07-15 PROCEDURE — G8427 DOCREV CUR MEDS BY ELIG CLIN: HCPCS | Performed by: INTERNAL MEDICINE

## 2024-07-15 PROCEDURE — 3017F COLORECTAL CA SCREEN DOC REV: CPT | Performed by: INTERNAL MEDICINE

## 2024-07-15 PROCEDURE — 3079F DIAST BP 80-89 MM HG: CPT | Performed by: INTERNAL MEDICINE

## 2024-07-15 PROCEDURE — G8417 CALC BMI ABV UP PARAM F/U: HCPCS | Performed by: INTERNAL MEDICINE

## 2024-07-15 PROCEDURE — 1090F PRES/ABSN URINE INCON ASSESS: CPT | Performed by: INTERNAL MEDICINE

## 2024-07-15 PROCEDURE — G0439 PPPS, SUBSEQ VISIT: HCPCS | Performed by: INTERNAL MEDICINE

## 2024-07-15 PROCEDURE — 1123F ACP DISCUSS/DSCN MKR DOCD: CPT | Performed by: INTERNAL MEDICINE

## 2024-07-15 SDOH — ECONOMIC STABILITY: FOOD INSECURITY: WITHIN THE PAST 12 MONTHS, THE FOOD YOU BOUGHT JUST DIDN'T LAST AND YOU DIDN'T HAVE MONEY TO GET MORE.: NEVER TRUE

## 2024-07-15 SDOH — ECONOMIC STABILITY: FOOD INSECURITY: WITHIN THE PAST 12 MONTHS, YOU WORRIED THAT YOUR FOOD WOULD RUN OUT BEFORE YOU GOT MONEY TO BUY MORE.: NEVER TRUE

## 2024-07-15 SDOH — ECONOMIC STABILITY: INCOME INSECURITY: HOW HARD IS IT FOR YOU TO PAY FOR THE VERY BASICS LIKE FOOD, HOUSING, MEDICAL CARE, AND HEATING?: NOT HARD AT ALL

## 2024-07-15 ASSESSMENT — PATIENT HEALTH QUESTIONNAIRE - PHQ9
SUM OF ALL RESPONSES TO PHQ QUESTIONS 1-9: 0
2. FEELING DOWN, DEPRESSED OR HOPELESS: NOT AT ALL
SUM OF ALL RESPONSES TO PHQ9 QUESTIONS 1 & 2: 0
1. LITTLE INTEREST OR PLEASURE IN DOING THINGS: NOT AT ALL

## 2024-07-15 ASSESSMENT — LIFESTYLE VARIABLES
HOW OFTEN DO YOU HAVE A DRINK CONTAINING ALCOHOL: MONTHLY OR LESS
HOW MANY STANDARD DRINKS CONTAINING ALCOHOL DO YOU HAVE ON A TYPICAL DAY: 1 OR 2

## 2024-07-15 NOTE — PATIENT INSTRUCTIONS
you include in an advance directive?  Many states have a unique advance directive form. (It may ask you to address specific issues.) Or you might use a universal form that's approved by many states.  If your form doesn't tell you what to address, it may be hard to know what to include in your advance directive. Use the questions below to help you get started.  Who do you want to make decisions about your medical care if you are not able to?  What life-support measures do you want if you have a serious illness that gets worse over time or can't be cured?  What are you most afraid of that might happen? (Maybe you're afraid of having pain, losing your independence, or being kept alive by machines.)  Where would you prefer to die? (Your home? A hospital? A nursing home?)  Do you want to donate your organs when you die?  Do you want certain Religion practices performed before you die?  When should you call for help?  Be sure to contact your doctor if you have any questions.  Where can you learn more?  Go to https://www.[employer]net/patientEd and enter R264 to learn more about \"Advance Directives: Care Instructions.\"  Current as of: November 16, 2023  Content Version: 14.1  © 4395-0066 [employer].   Care instructions adapted under license by [employer]. If you have questions about a medical condition or this instruction, always ask your healthcare professional. [employer] disclaims any warranty or liability for your use of this information.           A Healthy Heart: Care Instructions  Overview     Coronary artery disease, also called heart disease, occurs when a substance called plaque builds up in the vessels that supply oxygen-rich blood to your heart muscle. This can narrow the blood vessels and reduce blood flow. A heart attack happens when blood flow is completely blocked. A high-fat diet, smoking, and other factors increase the risk of heart disease.  Your doctor has found that you

## 2024-07-15 NOTE — PROGRESS NOTES
Chief Complaint   Patient presents with    Medicare AWV       \"Have you been to the ER, urgent care clinic since your last visit?  Hospitalized since your last visit?\"    YES - When: approximately 3 months ago.  Where and Why: [facility] for left shoulder and arm pain.    “Have you seen or consulted any other health care providers outside of [facility] since your last visit?”    NO        “Have you had a colorectal cancer screening such as a colonoscopy/FIT/Cologuard?    YES - Type: Colonoscopy - Where: [facility] Nurse/CMA to request most recent records if not in the chart     No colonoscopy on file  No cologuard on file  No FIT/FOBT on file   No flexible sigmoidoscopy on file         Click Here for Release of Records Request  
Medicare Annual Wellness Visit    Rena Mandel is here for Medicare AWV    Assessment & Plan   Medicare annual wellness visit, subsequent  Recommendations for Preventive Services Due: see orders and patient instructions/AVS.  Recommended screening schedule for the next 5-10 years is provided to the patient in written form: see Patient Instructions/AVS.     No follow-ups on file.     Subjective       Patient's complete Health Risk Assessment and screening values have been reviewed and are found in Flowsheets. The following problems were reviewed today and where indicated follow up appointments were made and/or referrals ordered.    Positive Risk Factor Screenings with Interventions:                Inactivity:  On average, how many days per week do you engage in moderate to strenuous exercise (like a brisk walk)?: 0 days (!) Abnormal  On average, how many minutes do you engage in exercise at this level?: 0 min  Interventions - Inactivity:  See A/P for plan and any pertinent orders   Abnormal BMI (obese):  Body mass index is 33.39 kg/m². (!) Abnormal  Interventions:  See A/P for plan and any pertinent orders        Dentist Screen:  Have you seen the dentist within the past year?: (!) No    Intervention:  See A/P for any pertinent orders        Advanced Directives:  Do you have a Living Will?: (!) No    Intervention:  has NO advanced directive - information provided                     Objective   Vitals:    07/15/24 1245   BP: 117/80   Site: Right Upper Arm   Position: Sitting   Pulse: 80   Resp: 18   Temp: 97.9 °F (36.6 °C)   TempSrc: Oral   SpO2: 93%   Weight: 102.6 kg (226 lb 1.6 oz)   Height: 1.753 m (5' 9\")      Body mass index is 33.39 kg/m².                    Allergies   Allergen Reactions    Acetaminophen Rash     Prior to Visit Medications    Medication Sig Taking? Authorizing Provider   ezetimibe (ZETIA) 10 MG tablet TAKE 1 TABLET BY MOUTH DAILY Yes Boone Gold MD   hydroCHLOROthiazide 
Cruz ([place] [facility] )     Referral Priority:   Routine     Referral Type:   Eval and Treat     Referral Reason:   Specialty Services Required     Referred to Provider:   Ana Graf MD     Requested Specialty:   Gastroenterology     Number of Visits Requested:   1    FULL CODE    UT COLLECTION VENOUS BLOOD VENIPUNCTURE        Follow-up and Dispositions    Return in about 1 year (around 7/15/2025).                ATTENTION:   This medical record was transcribed using an electronic medical records system.  Although proofread, it may and can contain electronic and spelling errors.  Other human spelling and other errors may be present.  Corrections may be executed at a later time.  Please feel free to contact us for any clarifications as needed.

## 2024-07-16 LAB
ALBUMIN SERPL-MCNC: 4.1 G/DL (ref 3.5–5)
ALBUMIN/GLOB SERPL: 1.1 (ref 1.1–2.2)
ALP SERPL-CCNC: 100 U/L (ref 45–117)
ALT SERPL-CCNC: 31 U/L (ref 12–78)
ANION GAP SERPL CALC-SCNC: 3 MMOL/L (ref 5–15)
APPEARANCE UR: CLEAR
AST SERPL-CCNC: 24 U/L (ref 15–37)
BACTERIA URNS QL MICRO: ABNORMAL /HPF
BASOPHILS # BLD: 0 K/UL (ref 0–0.1)
BASOPHILS NFR BLD: 1 % (ref 0–1)
BILIRUB SERPL-MCNC: 0.4 MG/DL (ref 0.2–1)
BILIRUB UR QL: NEGATIVE
BUN SERPL-MCNC: 12 MG/DL (ref 6–20)
BUN/CREAT SERPL: 13 (ref 12–20)
CALCIUM SERPL-MCNC: 11.2 MG/DL (ref 8.5–10.1)
CAOX CRY URNS QL MICRO: ABNORMAL
CHLORIDE SERPL-SCNC: 107 MMOL/L (ref 97–108)
CHOLEST SERPL-MCNC: 140 MG/DL
CO2 SERPL-SCNC: 30 MMOL/L (ref 21–32)
COLOR UR: ABNORMAL
CREAT SERPL-MCNC: 0.9 MG/DL (ref 0.55–1.02)
DIFFERENTIAL METHOD BLD: ABNORMAL
EOSINOPHIL # BLD: 0 K/UL (ref 0–0.4)
EOSINOPHIL NFR BLD: 1 % (ref 0–7)
EPITH CASTS URNS QL MICRO: ABNORMAL /LPF
ERYTHROCYTE [DISTWIDTH] IN BLOOD BY AUTOMATED COUNT: 14.5 % (ref 11.5–14.5)
EST. AVERAGE GLUCOSE BLD GHB EST-MCNC: 126 MG/DL
GLOBULIN SER CALC-MCNC: 3.9 G/DL (ref 2–4)
GLUCOSE SERPL-MCNC: 98 MG/DL (ref 65–100)
GLUCOSE UR STRIP.AUTO-MCNC: NEGATIVE MG/DL
HBA1C MFR BLD: 6 % (ref 4–5.6)
HCT VFR BLD AUTO: 47.4 % (ref 35–47)
HDLC SERPL-MCNC: 55 MG/DL
HDLC SERPL: 2.5 (ref 0–5)
HGB BLD-MCNC: 15.3 G/DL (ref 11.5–16)
HGB UR QL STRIP: ABNORMAL
IMM GRANULOCYTES # BLD AUTO: 0 K/UL (ref 0–0.04)
IMM GRANULOCYTES NFR BLD AUTO: 0 % (ref 0–0.5)
KETONES UR QL STRIP.AUTO: NEGATIVE MG/DL
LDLC SERPL CALC-MCNC: 72.2 MG/DL (ref 0–100)
LEUKOCYTE ESTERASE UR QL STRIP.AUTO: ABNORMAL
LYMPHOCYTES # BLD: 3 K/UL (ref 0.8–3.5)
LYMPHOCYTES NFR BLD: 52 % (ref 12–49)
MCH RBC QN AUTO: 30 PG (ref 26–34)
MCHC RBC AUTO-ENTMCNC: 32.3 G/DL (ref 30–36.5)
MCV RBC AUTO: 92.9 FL (ref 80–99)
MONOCYTES # BLD: 0.4 K/UL (ref 0–1)
MONOCYTES NFR BLD: 7 % (ref 5–13)
NEUTS SEG # BLD: 2.3 K/UL (ref 1.8–8)
NEUTS SEG NFR BLD: 40 % (ref 32–75)
NITRITE UR QL STRIP.AUTO: NEGATIVE
NRBC # BLD: 0 K/UL (ref 0–0.01)
NRBC BLD-RTO: 0 PER 100 WBC
PH UR STRIP: 6 (ref 5–8)
PLATELET # BLD AUTO: 170 K/UL (ref 150–400)
POTASSIUM SERPL-SCNC: 4.6 MMOL/L (ref 3.5–5.1)
PROT SERPL-MCNC: 8 G/DL (ref 6.4–8.2)
PROT UR STRIP-MCNC: ABNORMAL MG/DL
RBC # BLD AUTO: 5.1 M/UL (ref 3.8–5.2)
RBC #/AREA URNS HPF: ABNORMAL /HPF (ref 0–5)
SODIUM SERPL-SCNC: 140 MMOL/L (ref 136–145)
SP GR UR REFRACTOMETRY: >1.03 (ref 1–1.03)
TRIGL SERPL-MCNC: 64 MG/DL
TSH SERPL DL<=0.05 MIU/L-ACNC: 1.83 UIU/ML (ref 0.36–3.74)
UROBILINOGEN UR QL STRIP.AUTO: 0.2 EU/DL (ref 0.2–1)
VLDLC SERPL CALC-MCNC: 12.8 MG/DL
WBC # BLD AUTO: 5.8 K/UL (ref 3.6–11)
WBC URNS QL MICRO: ABNORMAL /HPF (ref 0–4)
YEAST BUDDING URNS QL: PRESENT

## 2024-07-17 DIAGNOSIS — E21.3 HYPERPARATHYROIDISM (HCC): Primary | ICD-10-CM

## 2024-07-17 LAB
CALCIUM SERPL-MCNC: 11.2 MG/DL (ref 8.5–10.1)
PTH-INTACT SERPL-MCNC: 118.5 PG/ML (ref 18.4–88)

## 2024-07-17 RX ORDER — AMLODIPINE BESYLATE 5 MG/1
5 TABLET ORAL DAILY
Qty: 90 TABLET | Refills: 3 | Status: SHIPPED | OUTPATIENT
Start: 2024-07-17

## 2024-07-17 RX ORDER — HYDROCHLOROTHIAZIDE 12.5 MG/1
12.5 TABLET ORAL DAILY
Qty: 90 TABLET | Refills: 3 | Status: SHIPPED | OUTPATIENT
Start: 2024-07-17

## 2024-07-17 RX ORDER — EZETIMIBE 10 MG/1
10 TABLET ORAL DAILY
Qty: 60 TABLET | Refills: 0 | Status: SHIPPED | OUTPATIENT
Start: 2024-07-17 | End: 2024-07-18

## 2024-07-17 RX ORDER — ROSUVASTATIN CALCIUM 40 MG/1
40 TABLET, COATED ORAL NIGHTLY
Qty: 90 TABLET | Refills: 3 | Status: SHIPPED | OUTPATIENT
Start: 2024-07-17

## 2024-07-18 RX ORDER — EZETIMIBE 10 MG/1
10 TABLET ORAL DAILY
Qty: 90 TABLET | Refills: 3 | Status: SHIPPED | OUTPATIENT
Start: 2024-07-18

## 2024-07-18 NOTE — TELEPHONE ENCOUNTER
----- Message from Boone Gold MD sent at 7/17/2024  8:08 PM EDT -----  Your calcium is increased and with the elevated PTH level you have primary hyperparathyroidism.  I have requested a urine sample for calcium and a scan of your parathyroid glands.

## 2024-07-19 ENCOUNTER — TELEPHONE (OUTPATIENT)
Facility: CLINIC | Age: 69
End: 2024-07-19

## 2024-07-19 NOTE — TELEPHONE ENCOUNTER
Patient notified and ask to schedule appt for parathroid scan and u/s and we ask her to return for requested labs

## 2024-07-21 LAB
ALBUMIN SERPL ELPH-MCNC: 3.7 G/DL (ref 2.9–4.4)
ALBUMIN/GLOB SERPL: 1 (ref 0.7–1.7)
ALPHA1 GLOB SERPL ELPH-MCNC: 0.3 G/DL (ref 0–0.4)
ALPHA2 GLOB SERPL ELPH-MCNC: 0.8 G/DL (ref 0.4–1)
B-GLOBULIN SERPL ELPH-MCNC: 1.3 G/DL (ref 0.7–1.3)
GAMMA GLOB SERPL ELPH-MCNC: 1.6 G/DL (ref 0.4–1.8)
GLOBULIN SER-MCNC: 3.9 G/DL (ref 2.2–3.9)
IGA SERPL-MCNC: 286 MG/DL (ref 87–352)
IGG SERPL-MCNC: 1631 MG/DL (ref 586–1602)
IGM SERPL-MCNC: 75 MG/DL (ref 26–217)
INTERPRETATION SERPL IEP-IMP: ABNORMAL
KAPPA LC FREE SER-MCNC: 23.3 MG/L (ref 3.3–19.4)
KAPPA LC FREE/LAMBDA FREE SER: 1.71 (ref 0.26–1.65)
LAMBDA LC FREE SERPL-MCNC: 13.6 MG/L (ref 5.7–26.3)
M PROTEIN SERPL ELPH-MCNC: ABNORMAL G/DL
PROT SERPL-MCNC: 7.6 G/DL (ref 6–8.5)

## 2024-07-24 ENCOUNTER — TELEPHONE (OUTPATIENT)
Facility: CLINIC | Age: 69
End: 2024-07-24

## 2024-07-31 DIAGNOSIS — E21.3 HYPERPARATHYROIDISM (HCC): ICD-10-CM

## 2024-07-31 DIAGNOSIS — E04.2 MULTINODULAR GOITER: Primary | ICD-10-CM

## 2024-07-31 LAB
CALCIUM 24H UR-MRATE: 173 MG/24 HR (ref 142–353)
COLLECT DURATION TIME UR: 24 HR
SPECIMEN VOL 24H UR: 1700 ML

## 2024-08-01 ENCOUNTER — TELEPHONE (OUTPATIENT)
Facility: CLINIC | Age: 69
End: 2024-08-01

## 2024-08-01 ENCOUNTER — CLINICAL DOCUMENTATION (OUTPATIENT)
Facility: CLINIC | Age: 69
End: 2024-08-01

## 2024-08-01 NOTE — TELEPHONE ENCOUNTER
Patient notified that urine calcium is normal per Dr. Gold. Patient advised to proceed with making appointment for thyroid scan

## 2024-08-01 NOTE — TELEPHONE ENCOUNTER
----- Message from Boone Gold MD sent at 7/31/2024  8:49 PM EDT -----  Urine calcium is normal.  Please proceed or make an appointment for nuclear medicine parathyroid hormone scan.  I also note that is time to repeat the ultrasound of your thyroid which is also been requested.

## 2024-08-05 RX ORDER — AMLODIPINE BESYLATE 5 MG/1
5 TABLET ORAL DAILY
Qty: 90 TABLET | Refills: 3 | Status: SHIPPED | OUTPATIENT
Start: 2024-08-05

## 2024-08-07 ENCOUNTER — HOSPITAL ENCOUNTER (OUTPATIENT)
Facility: HOSPITAL | Age: 69
Discharge: HOME | End: 2024-08-10
Payer: MEDICARE

## 2024-08-07 VITALS — BODY MASS INDEX: 33.03 KG/M2 | HEIGHT: 69 IN | WEIGHT: 223 LBS

## 2024-08-07 DIAGNOSIS — I10 PRIMARY HYPERTENSION: ICD-10-CM

## 2024-08-07 DIAGNOSIS — Z12.31 ENCOUNTER FOR SCREENING MAMMOGRAM FOR MALIGNANT NEOPLASM OF BREAST: ICD-10-CM

## 2024-08-07 PROCEDURE — 77063 BREAST TOMOSYNTHESIS BI: CPT

## 2024-08-19 ENCOUNTER — HOSPITAL ENCOUNTER (OUTPATIENT)
Facility: HOSPITAL | Age: 69
Discharge: HOME | End: 2024-08-22
Attending: INTERNAL MEDICINE
Payer: MEDICARE

## 2024-08-19 DIAGNOSIS — E04.2 MULTINODULAR GOITER: ICD-10-CM

## 2024-08-19 DIAGNOSIS — E21.3 HYPERPARATHYROIDISM (HCC): ICD-10-CM

## 2024-08-19 PROCEDURE — A9500 TC99M SESTAMIBI: HCPCS | Performed by: INTERNAL MEDICINE

## 2024-08-19 PROCEDURE — 3430000000 HC RX DIAGNOSTIC RADIOPHARMACEUTICAL: Performed by: INTERNAL MEDICINE

## 2024-08-19 PROCEDURE — 78070 PARATHYROID PLANAR IMAGING: CPT

## 2024-08-19 PROCEDURE — 76536 US EXAM OF HEAD AND NECK: CPT

## 2024-08-19 RX ORDER — TETRAKIS(2-METHOXYISOBUTYLISOCYANIDE)COPPER(I) TETRAFLUOROBORATE 1 MG/ML
27 INJECTION, POWDER, LYOPHILIZED, FOR SOLUTION INTRAVENOUS
Status: COMPLETED | OUTPATIENT
Start: 2024-08-19 | End: 2024-08-19

## 2024-08-19 RX ADMIN — TETRAKIS(2-METHOXYISOBUTYLISOCYANIDE)COPPER(I) TETRAFLUOROBORATE 27 MILLICURIE: 1 INJECTION, POWDER, LYOPHILIZED, FOR SOLUTION INTRAVENOUS at 11:00

## 2024-08-20 DIAGNOSIS — E21.3 HYPERPARATHYROIDISM (HCC): ICD-10-CM

## 2024-08-23 ENCOUNTER — HOSPITAL ENCOUNTER (OUTPATIENT)
Facility: HOSPITAL | Age: 69
Setting detail: OUTPATIENT SURGERY
Discharge: HOME | End: 2024-08-23
Attending: INTERNAL MEDICINE | Admitting: INTERNAL MEDICINE
Payer: MEDICARE

## 2024-08-23 ENCOUNTER — ANESTHESIA EVENT (OUTPATIENT)
Facility: HOSPITAL | Age: 69
End: 2024-08-23
Payer: MEDICARE

## 2024-08-23 ENCOUNTER — ANESTHESIA (OUTPATIENT)
Facility: HOSPITAL | Age: 69
End: 2024-08-23
Payer: MEDICARE

## 2024-08-23 VITALS
OXYGEN SATURATION: 97 % | WEIGHT: 225.9 LBS | SYSTOLIC BLOOD PRESSURE: 127 MMHG | HEIGHT: 69 IN | BODY MASS INDEX: 33.46 KG/M2 | DIASTOLIC BLOOD PRESSURE: 68 MMHG | RESPIRATION RATE: 19 BRPM | HEART RATE: 67 BPM | TEMPERATURE: 97.5 F

## 2024-08-23 PROCEDURE — 3600007502: Performed by: INTERNAL MEDICINE

## 2024-08-23 PROCEDURE — 3700000000 HC ANESTHESIA ATTENDED CARE: Performed by: INTERNAL MEDICINE

## 2024-08-23 PROCEDURE — 2709999900 HC NON-CHARGEABLE SUPPLY: Performed by: INTERNAL MEDICINE

## 2024-08-23 PROCEDURE — 3700000001 HC ADD 15 MINUTES (ANESTHESIA): Performed by: INTERNAL MEDICINE

## 2024-08-23 PROCEDURE — 2500000003 HC RX 250 WO HCPCS

## 2024-08-23 PROCEDURE — 6360000002 HC RX W HCPCS

## 2024-08-23 PROCEDURE — 3600007512: Performed by: INTERNAL MEDICINE

## 2024-08-23 PROCEDURE — 7100000010 HC PHASE II RECOVERY - FIRST 15 MIN: Performed by: INTERNAL MEDICINE

## 2024-08-23 PROCEDURE — 2580000003 HC RX 258: Performed by: INTERNAL MEDICINE

## 2024-08-23 RX ORDER — PHENYLEPHRINE HCL IN 0.9% NACL 0.4MG/10ML
SYRINGE (ML) INTRAVENOUS PRN
Status: DISCONTINUED | OUTPATIENT
Start: 2024-08-23 | End: 2024-08-23 | Stop reason: SDUPTHER

## 2024-08-23 RX ORDER — EPHEDRINE SULFATE/0.9% NACL/PF 50 MG/5 ML
SYRINGE (ML) INTRAVENOUS PRN
Status: DISCONTINUED | OUTPATIENT
Start: 2024-08-23 | End: 2024-08-23 | Stop reason: SDUPTHER

## 2024-08-23 RX ORDER — LIDOCAINE HYDROCHLORIDE 20 MG/ML
INJECTION, SOLUTION EPIDURAL; INFILTRATION; INTRACAUDAL; PERINEURAL PRN
Status: DISCONTINUED | OUTPATIENT
Start: 2024-08-23 | End: 2024-08-23 | Stop reason: SDUPTHER

## 2024-08-23 RX ORDER — SODIUM CHLORIDE 0.9 % (FLUSH) 0.9 %
5-40 SYRINGE (ML) INJECTION EVERY 12 HOURS SCHEDULED
Status: DISCONTINUED | OUTPATIENT
Start: 2024-08-23 | End: 2024-08-23 | Stop reason: HOSPADM

## 2024-08-23 RX ORDER — SODIUM CHLORIDE 0.9 % (FLUSH) 0.9 %
5-40 SYRINGE (ML) INJECTION PRN
Status: DISCONTINUED | OUTPATIENT
Start: 2024-08-23 | End: 2024-08-23 | Stop reason: HOSPADM

## 2024-08-23 RX ORDER — SODIUM CHLORIDE 9 MG/ML
25 INJECTION, SOLUTION INTRAVENOUS PRN
Status: DISCONTINUED | OUTPATIENT
Start: 2024-08-23 | End: 2024-08-23 | Stop reason: HOSPADM

## 2024-08-23 RX ADMIN — Medication 5 MG: at 10:32

## 2024-08-23 RX ADMIN — LIDOCAINE HYDROCHLORIDE 40 MG: 20 INJECTION, SOLUTION EPIDURAL; INFILTRATION; INTRACAUDAL; PERINEURAL at 10:26

## 2024-08-23 RX ADMIN — PROPOFOL 230 MG: 10 INJECTION, EMULSION INTRAVENOUS at 10:50

## 2024-08-23 RX ADMIN — SODIUM CHLORIDE 25 ML: 9 INJECTION, SOLUTION INTRAVENOUS at 10:15

## 2024-08-23 RX ADMIN — PROPOFOL 100 MG: 10 INJECTION, EMULSION INTRAVENOUS at 10:26

## 2024-08-23 RX ADMIN — Medication 40 MCG: at 10:30

## 2024-08-23 ASSESSMENT — PAIN - FUNCTIONAL ASSESSMENT: PAIN_FUNCTIONAL_ASSESSMENT: 0-10

## 2024-08-23 NOTE — H&P
G I Procedure Note           Endoscopy History and Physical           Dr. Maximino Rosenberg      [place] Office                Rena Mandel [id number]  [id number]    1955  68 y.o.  female      Date of Procedure:   Preoperative Diagnosis:       Procedure:   [unfilled]      Personal history of colonic polyps [Z86.010]                         Procedure(s):  COLONOSCOPY      Gastroenterologist:  Anesthesia:           Maximino Rosenberg MD                               Monitor Anesthesia Care            History and procedure indication:  Rena Mandel is a 68 y.o. Black /  female who presents with: Personal history of colonic polyps [Z86.010]   including the additional history of Screening and Personal History of Polyps ,Screening for colon cancer,,        Past Medical History:   Diagnosis Date    Encounter to establish care     Heterozygous familial hypercholesterolemia 02/01/2022    Hypertension     Multinodular goiter 05/10/2023    Obesity (BMI 35.0-39.9 without comorbidity)       Prior to Admission medications    Medication Sig Start Date End Date Taking? Authorizing Provider   amLODIPine (NORVASC) 5 MG tablet TAKE 1 TABLET BY MOUTH DAILY 8/5/24   Boone Gold MD   ezetimibe (ZETIA) 10 MG tablet TAKE 1 TABLET BY MOUTH DAILY 7/18/24   Boone Gold MD   hydroCHLOROthiazide 12.5 MG tablet Take 1 tablet by mouth daily 7/17/24   Boone Gold MD   rosuvastatin (CRESTOR) 40 MG tablet Take 1 tablet by mouth nightly 7/17/24   Boone Gold MD     Allergies   Allergen Reactions    Acetaminophen Rash       Past Surgical History:   Procedure Laterality Date    COLONOSCOPY      polyps removed    GYN      Hysterectomy     Family History   Problem Relation Age of Onset    Hypertension Sister     Elevated Lipids Sister     Asthma Mother     Unknown Mother       Social History     Tobacco Use    Smoking status:  Never    Smokeless tobacco: Never   Substance Use Topics    Alcohol use: Yes     Comment: once a month                                                      PHYSICAL EXAM   There were no vitals taken for this visit.    General appearance:  alert,  in no distress  Mental status:  normal mood, behavior, speech, dress, motor activity and thought processes  Nose:      normal and patent, no erythema, discharge    Mouth:- mucous membranes moist, pharynx normal without lesions                  [x]  No Loose teeth      []    Loose teeth  Finger opening:  []1     []1.5    [] 2     [] 2.5     [x] 3      [] 3.5     [] 4   Mallampati:         [] Class 1     [x] Class 2    [] Class 3      [] Class 4      Neck - supple,      [x] Full ROM [] Decreased ROM  [] Short Neck no significant adenopathy    Chest - clear to auscultation, no wheezes, rales or rhonchi, symmetric air entry  Heart: normal rate, regular rhythm, normal S1, S2, no murmurs,   Abdomen: abdomen soft, bowel sounds  [x] normal  [] increased  [] hypoactive                  [x] no tenderness  [] epigastric tenderness  [] LLQ tenderness   [] RLQ tenderness                      No masses, organomegaly or guarding.  Rectal exam: negative without mass, lesions or tenderness  Extremities:  , no pedal edema, no  cyanosis  Neurologic: Alert and oriented to person, place, and time;                          Normal symmetric reflexes  Normal gait:                                      Assessement:                                 Pre op dx:  Personal history of colonic polyps [Z86.010]   Additional medical problems list below   Patient Active Problem List   Diagnosis    Obesity (BMI 30.0-34.9)    Hypertension    Heterozygous familial hypercholesterolemia    IGT (impaired glucose tolerance)    Multinodular goiter                                                                                           This note documentation was performed prior to this planned procedure       after a

## 2024-08-23 NOTE — OP NOTE
G I Procedure Note            COLONOSCOPY   Dr. Maximino Rosenberg   [place] office     Rena Mandel                                   [id number]                                  [id number]   1955                                      68 y.o.                                    female      Procedure Date: @date@   [x]  Anesthesia MAC                                                                                                Pre Op Diagnosis:    Indications:                   1. Personal history of colonic polyps [Z86.010]                                                                                                                                                                          Post Op Diagnosis:                    1.   Internal hemorrhois                                                           2.                     H&p completed: Yes      Anesthesia Assessment: Performed prior to procedure:      No change  Anesthesia Plan: Performed prior to procedure:                   No change       Medications: See Reviewed List and Reconcilation           Informed consent was obtained     Risk Statement:  Prior to the procedure the risks were explained to the patient and/or to the family including but not limited to perforation, bleeding, adverse drug reaction, aspiration, and even the need for possible surgery.  A colonoscopy exam is not 100% accurate which may be related to preparation or blind spots during the exam.The possibility that an abnormality and /or cancer could be missed was also discussed as well as alternative x-ray options.         Instrument:    Olympus adult Videocolonoscope                                   Immediate Procedure Reassessment Completed     With the patient in the left lateral position, a rectal examination was performed and the findings were: negative without mass, lesions or tenderness   The Olympus

## 2024-08-23 NOTE — ANESTHESIA PRE PROCEDURE
Department of Anesthesiology  Preprocedure Note       Name:  Rena Mandel   Age:  68 y.o.  :  1955                                          MRN:  [id number]         Date:  2024      Surgeon: Surgeon(s):  Maximino Rosenberg MD    Procedure: Procedure(s):  COLONOSCOPY    Medications prior to admission:   Prior to Admission medications    Medication Sig Start Date End Date Taking? Authorizing Provider   amLODIPine (NORVASC) 5 MG tablet TAKE 1 TABLET BY MOUTH DAILY 24   Boone Gold MD   ezetimibe (ZETIA) 10 MG tablet TAKE 1 TABLET BY MOUTH DAILY 24   Boone oGld MD   hydroCHLOROthiazide 12.5 MG tablet Take 1 tablet by mouth daily 24   Boone Gold MD   rosuvastatin (CRESTOR) 40 MG tablet Take 1 tablet by mouth nightly 24   Boone Gold MD       Current medications:    No current facility-administered medications for this encounter.     Current Outpatient Medications   Medication Sig Dispense Refill    amLODIPine (NORVASC) 5 MG tablet TAKE 1 TABLET BY MOUTH DAILY 90 tablet 3    ezetimibe (ZETIA) 10 MG tablet TAKE 1 TABLET BY MOUTH DAILY 90 tablet 3    hydroCHLOROthiazide 12.5 MG tablet Take 1 tablet by mouth daily 90 tablet 3    rosuvastatin (CRESTOR) 40 MG tablet Take 1 tablet by mouth nightly 90 tablet 3       Allergies:    Allergies   Allergen Reactions    Acetaminophen Rash       Problem List:    Patient Active Problem List   Diagnosis Code    Obesity (BMI 30.0-34.9) E66.9    Hypertension I10    Heterozygous familial hypercholesterolemia E78.01    IGT (impaired glucose tolerance) R73.02    Multinodular goiter E04.2       Past Medical History:        Diagnosis Date    Encounter to establish care     Heterozygous familial hypercholesterolemia 2022    Hypertension     Multinodular goiter 05/10/2023    Obesity (BMI 35.0-39.9 without comorbidity)        Past Surgical History:        Procedure Laterality Date    COLONOSCOPY      polyps removed

## 2024-08-23 NOTE — DISCHARGE INSTRUCTIONS
Endoscopy Discharge Instructions     Dr. Maximino Rosenberg     [place] office                                            NAME: Rena Mandel RECORD NUMBER:[id number]    AGE:  68 y.o. YOB: 1955                                                              FINAL Discharge Procedure and Diagnosis:       Procedure(s):  COLONOSCOPY       FINDINGS:     Int hemorrhoids                                        MEDICATIONS    [x] CONTINUE CURRENT MEDICATIONS     [] NEW MEDICATIONS           1.    2.    3.         Testing   Schedule              Colonoscopy Screening                                   Recommendations       []  Repeat colonoscopy in 6-12 month         2nd to Inadequate  prep    []  Repeat colonoscopy in 3 years    []  Repeat colonoscopy in 5 years    [x]  Repeat colonoscopy in 10 years         New additional  Tests  Call the office   [phone] for the appointment time      []      []      []                                     YOUR NEXT APPOINTMENT WITH DR ROSENBERG:                                                                                                                                [x]   None follow up with pcp   []  1 week       []   2 week    []  1 month    Always keep KEEP  APPOINTMENT WITH  @PCP@ for regular medical follow up                                                                                                                         If you had a colonoscopy the \"C\" indicates specific instructions        x                                           Diet Instructions :   Ordinarily you may resume your previous diet but your initial diet should be       Light your discharge nurse will go over this with you.  Large meals can cause  abdominal discomfort after these procedures.                                                                           Specific Diet Recommendations:        [x] High fiber diet.   https://www.[employer]/diets/        [] GERD diet: avoid fried and fatty foods, peppermint, chocolate, alcohol,               coffee, citrus fruits and juices, and tomato products. Avoid lying down for            2 to 3  hours after eating. https://www.[employer]/diets/            []  FODMAP DIET  https://www.[employer]sfree.net/what-is-fodmap-diet/              []  All diets eg high fiber, gastroparesis., weight loss , gluten free             1. https://www.everydiet.org/diet              2.  https://www.[employer]/diets/           __x__  You may feel quite tired and need to rest and recuperate for several hours    following these procedures.                                                                                  __x__  Due to the fact that sedation was administered for this procedure, do not drive,   operate machinery or sign legal documents for the next 24 hours.         __x__  Mild abdominal pain may be experienced after your procedure, but is should   disappear after several hours. Notify your physician if you have persistent pain,   tenderness or abdominal distension.                                         __x__  C    Many patients for the first few hours following the exam may experience         belching or passing gas through the rectum. Walking may help to relieve        distention and gas pains. A warm bath or shower will often help with abdominal  cramping.                                                                                            __x__   You may return to your normal routine tomorrow, according to how you feel        and depending on your doctor’s instructions. Be sure to call your doctor to make  an appointment for a post-surgery check-up on the date your doctor has   requested.      __x__ C     Rectal bleeding or spotting in small amounts may occur with the first bowel   movement following a colonoscopy or sigmoidoscopy. If a large amount of blood is noted call immediately     __x__   operate hazardous machinery  Do not make important personal, legal or business decisions  Do not drink alcoholic beverages  If you have not urinated within 8 hours after discharge, please contact your physician  Resume your medications unless otherwise instructed    For 24 hours after general anesthesia or intravenous analgesia / sedation  you may experience:  Drowsiness, dizziness, sleepiness, or confusion  Difficulty remembering or delayed reaction times  Difficulty with your balance, especially while walking, move slowly and carefully, do not make sudden position changes  Difficulty focusing or blurred vision    You may not be aware of slight changes in your behavior and/or your reaction time because of the medication used during and after your procedure.    Report the following to your physician:  Excessive pain, swelling, redness or odor of or around the surgical area  Temperature over 100.5  Nausea and vomiting lasting longer than 4 hours or if unable to take medications  Any signs of decreased circulation or nerve impairment to extremity: change in color, persistent numbness, tingling, coldness or increase pain  Any questions or concerns    IF YOU REPORT TO AN EMERGENCY ROOM, DOCTOR'S OFFICE OR HOSPITAL WITHIN 24 HOURS AFTER YOUR PROCEDURE, BRING THIS SHEET AND YOUR AFTER VISIT SUMMARY WITH YOU AND GIVE IT TO THE PHYSICIAN OR NURSE ATTENDING YOU.

## 2024-08-23 NOTE — ANESTHESIA POSTPROCEDURE EVALUATION
Department of Anesthesiology  Postprocedure Note    Patient: Rena Mandel  MRN: [id number]  YOB: 1955  Date of evaluation: 8/23/2024    Procedure Summary       Date: 08/23/24 Room / Location: [facility] 02 / [facility] ENDOSCOPY    Anesthesia Start: 1023 Anesthesia Stop: 1052    Procedure: COLONOSCOPY Diagnosis:       Personal history of colonic polyps      (Personal history of colonic polyps [Z86.010])    Surgeons: Maximino Rosenberg MD Responsible Provider: Kevin Nguyen MD    Anesthesia Type: MAC ASA Status: 2            Anesthesia Type: MAC    Jerry Phase I: Jerry Score: 10    Jerry Phase II: Jerry Score: 10    Anesthesia Post Evaluation    Patient location during evaluation: PACU  Patient participation: complete - patient participated  Level of consciousness: awake and alert  Airway patency: patent  Nausea & Vomiting: no nausea  Cardiovascular status: hemodynamically stable  Respiratory status: acceptable  Hydration status: euvolemic    No notable events documented.

## 2024-12-02 RX ORDER — AZITHROMYCIN 250 MG/1
TABLET, FILM COATED ORAL
Qty: 6 TABLET | Refills: 0 | Status: SHIPPED | OUTPATIENT
Start: 2024-12-02 | End: 2024-12-12

## 2025-03-17 RX ORDER — AZITHROMYCIN 250 MG/1
TABLET, FILM COATED ORAL
Qty: 6 TABLET | Refills: 0 | Status: SHIPPED | OUTPATIENT
Start: 2025-03-17 | End: 2025-03-27

## 2025-03-20 RX ORDER — FLUTICASONE PROPIONATE 50 MCG
2 SPRAY, SUSPENSION (ML) NASAL DAILY
Qty: 1 EACH | Refills: 11 | Status: SHIPPED | OUTPATIENT
Start: 2025-03-20

## 2025-06-18 ENCOUNTER — APPOINTMENT (OUTPATIENT)
Facility: HOSPITAL | Age: 70
End: 2025-06-18
Payer: MEDICARE

## 2025-06-18 ENCOUNTER — HOSPITAL ENCOUNTER (EMERGENCY)
Facility: HOSPITAL | Age: 70
Discharge: HOME | End: 2025-06-18
Attending: EMERGENCY MEDICINE
Payer: MEDICARE

## 2025-06-18 VITALS
DIASTOLIC BLOOD PRESSURE: 87 MMHG | RESPIRATION RATE: 16 BRPM | OXYGEN SATURATION: 92 % | WEIGHT: 220.9 LBS | BODY MASS INDEX: 32.72 KG/M2 | TEMPERATURE: 98.1 F | HEART RATE: 74 BPM | HEIGHT: 69 IN | SYSTOLIC BLOOD PRESSURE: 158 MMHG

## 2025-06-18 DIAGNOSIS — M47.812 CERVICAL ARTHRITIS: ICD-10-CM

## 2025-06-18 DIAGNOSIS — M19.012 ARTHRITIS OF LEFT SHOULDER: ICD-10-CM

## 2025-06-18 DIAGNOSIS — M54.12 CERVICAL RADICULOPATHY: Primary | ICD-10-CM

## 2025-06-18 LAB
ALBUMIN SERPL-MCNC: 3.7 G/DL (ref 3.5–5)
ALBUMIN/GLOB SERPL: 0.9 (ref 1.1–2.2)
ALP SERPL-CCNC: 107 U/L (ref 45–117)
ALT SERPL-CCNC: 35 U/L (ref 12–78)
ANION GAP SERPL CALC-SCNC: 1 MMOL/L (ref 2–12)
AST SERPL-CCNC: 21 U/L (ref 15–37)
BASOPHILS # BLD: 0.02 K/UL (ref 0–0.1)
BASOPHILS NFR BLD: 0.5 % (ref 0–1)
BILIRUB SERPL-MCNC: 0.6 MG/DL (ref 0.2–1)
BUN SERPL-MCNC: 7 MG/DL (ref 6–20)
BUN/CREAT SERPL: 10 (ref 12–20)
CALCIUM SERPL-MCNC: 10.2 MG/DL (ref 8.5–10.1)
CHLORIDE SERPL-SCNC: 109 MMOL/L (ref 97–108)
CO2 SERPL-SCNC: 29 MMOL/L (ref 21–32)
CREAT SERPL-MCNC: 0.73 MG/DL (ref 0.55–1.02)
DIFFERENTIAL METHOD BLD: ABNORMAL
EKG ATRIAL RATE: 71 BPM
EKG DIAGNOSIS: NORMAL
EKG P AXIS: 62 DEGREES
EKG P-R INTERVAL: 154 MS
EKG Q-T INTERVAL: 374 MS
EKG QRS DURATION: 78 MS
EKG QTC CALCULATION (BAZETT): 406 MS
EKG R AXIS: 61 DEGREES
EKG T AXIS: -14 DEGREES
EKG VENTRICULAR RATE: 71 BPM
EOSINOPHIL # BLD: 0.05 K/UL (ref 0–0.4)
EOSINOPHIL NFR BLD: 1.2 % (ref 0–7)
ERYTHROCYTE [DISTWIDTH] IN BLOOD BY AUTOMATED COUNT: 14.6 % (ref 11.5–14.5)
GLOBULIN SER CALC-MCNC: 4 G/DL (ref 2–4)
GLUCOSE SERPL-MCNC: 109 MG/DL (ref 65–100)
HCT VFR BLD AUTO: 44.8 % (ref 35–47)
HGB BLD-MCNC: 14.6 G/DL (ref 11.5–16)
IMM GRANULOCYTES # BLD AUTO: 0.01 K/UL (ref 0–0.04)
IMM GRANULOCYTES NFR BLD AUTO: 0.2 % (ref 0–0.5)
LYMPHOCYTES # BLD: 2.21 K/UL (ref 0.8–3.5)
LYMPHOCYTES NFR BLD: 52 % (ref 12–49)
MCH RBC QN AUTO: 29.7 PG (ref 26–34)
MCHC RBC AUTO-ENTMCNC: 32.6 G/DL (ref 30–36.5)
MCV RBC AUTO: 91.1 FL (ref 80–99)
MONOCYTES # BLD: 0.33 K/UL (ref 0–1)
MONOCYTES NFR BLD: 7.8 % (ref 5–13)
NEUTS SEG # BLD: 1.63 K/UL (ref 1.8–8)
NEUTS SEG NFR BLD: 38.3 % (ref 32–75)
NRBC # BLD: 0 K/UL (ref 0–0.01)
NRBC BLD-RTO: 0 PER 100 WBC
PLATELET # BLD AUTO: 152 K/UL (ref 150–400)
PMV BLD AUTO: 12 FL (ref 8.9–12.9)
POTASSIUM SERPL-SCNC: 4 MMOL/L (ref 3.5–5.1)
PROT SERPL-MCNC: 7.7 G/DL (ref 6.4–8.2)
RBC # BLD AUTO: 4.92 M/UL (ref 3.8–5.2)
SODIUM SERPL-SCNC: 139 MMOL/L (ref 136–145)
TROPONIN I SERPL HS-MCNC: 5 NG/L (ref 0–51)
WBC # BLD AUTO: 4.3 K/UL (ref 3.6–11)

## 2025-06-18 PROCEDURE — 2500000003 HC RX 250 WO HCPCS: Performed by: EMERGENCY MEDICINE

## 2025-06-18 PROCEDURE — 6360000002 HC RX W HCPCS: Performed by: EMERGENCY MEDICINE

## 2025-06-18 PROCEDURE — 96374 THER/PROPH/DIAG INJ IV PUSH: CPT

## 2025-06-18 PROCEDURE — 72050 X-RAY EXAM NECK SPINE 4/5VWS: CPT

## 2025-06-18 PROCEDURE — 73030 X-RAY EXAM OF SHOULDER: CPT

## 2025-06-18 PROCEDURE — 85025 COMPLETE CBC W/AUTO DIFF WBC: CPT

## 2025-06-18 PROCEDURE — 99285 EMERGENCY DEPT VISIT HI MDM: CPT

## 2025-06-18 PROCEDURE — 96375 TX/PRO/DX INJ NEW DRUG ADDON: CPT

## 2025-06-18 PROCEDURE — 36415 COLL VENOUS BLD VENIPUNCTURE: CPT

## 2025-06-18 PROCEDURE — 93005 ELECTROCARDIOGRAM TRACING: CPT | Performed by: EMERGENCY MEDICINE

## 2025-06-18 PROCEDURE — 84484 ASSAY OF TROPONIN QUANT: CPT

## 2025-06-18 PROCEDURE — 80053 COMPREHEN METABOLIC PANEL: CPT

## 2025-06-18 RX ORDER — DIAZEPAM 10 MG/2ML
2.5 INJECTION, SOLUTION INTRAMUSCULAR; INTRAVENOUS ONCE
Status: COMPLETED | OUTPATIENT
Start: 2025-06-18 | End: 2025-06-18

## 2025-06-18 RX ORDER — ONDANSETRON 2 MG/ML
4 INJECTION INTRAMUSCULAR; INTRAVENOUS ONCE
Status: COMPLETED | OUTPATIENT
Start: 2025-06-18 | End: 2025-06-18

## 2025-06-18 RX ORDER — MORPHINE SULFATE 4 MG/ML
4 INJECTION, SOLUTION INTRAMUSCULAR; INTRAVENOUS
Status: COMPLETED | OUTPATIENT
Start: 2025-06-18 | End: 2025-06-18

## 2025-06-18 RX ORDER — METHOCARBAMOL 750 MG/1
750 TABLET, FILM COATED ORAL 4 TIMES DAILY PRN
Qty: 20 TABLET | Refills: 0 | Status: SHIPPED | OUTPATIENT
Start: 2025-06-18 | End: 2025-06-28

## 2025-06-18 RX ORDER — PREDNISONE 20 MG/1
20 TABLET ORAL DAILY
Qty: 4 TABLET | Refills: 0 | Status: SHIPPED | OUTPATIENT
Start: 2025-06-19 | End: 2025-06-23

## 2025-06-18 RX ORDER — LIDOCAINE 50 MG/G
1 PATCH TOPICAL DAILY
Qty: 10 PATCH | Refills: 0 | Status: SHIPPED | OUTPATIENT
Start: 2025-06-18 | End: 2025-06-28

## 2025-06-18 RX ADMIN — DIAZEPAM 2.5 MG: 5 INJECTION, SOLUTION INTRAMUSCULAR; INTRAVENOUS at 10:50

## 2025-06-18 RX ADMIN — METHYLPREDNISOLONE SODIUM SUCCINATE 125 MG: 125 INJECTION, POWDER, LYOPHILIZED, FOR SOLUTION INTRAMUSCULAR; INTRAVENOUS at 12:01

## 2025-06-18 RX ADMIN — MORPHINE SULFATE 4 MG: 4 INJECTION, SOLUTION INTRAMUSCULAR; INTRAVENOUS at 12:12

## 2025-06-18 RX ADMIN — ONDANSETRON 4 MG: 2 INJECTION, SOLUTION INTRAMUSCULAR; INTRAVENOUS at 12:12

## 2025-06-18 ASSESSMENT — PAIN DESCRIPTION - ONSET: ONSET: ON-GOING

## 2025-06-18 ASSESSMENT — PAIN SCALES - GENERAL
PAINLEVEL_OUTOF10: 10

## 2025-06-18 ASSESSMENT — PAIN DESCRIPTION - FREQUENCY: FREQUENCY: INTERMITTENT

## 2025-06-18 ASSESSMENT — PAIN DESCRIPTION - LOCATION: LOCATION: ARM

## 2025-06-18 ASSESSMENT — PAIN - FUNCTIONAL ASSESSMENT
PAIN_FUNCTIONAL_ASSESSMENT: PREVENTS OR INTERFERES SOME ACTIVE ACTIVITIES AND ADLS
PAIN_FUNCTIONAL_ASSESSMENT: 0-10

## 2025-06-18 ASSESSMENT — LIFESTYLE VARIABLES
HOW OFTEN DO YOU HAVE A DRINK CONTAINING ALCOHOL: NEVER
HOW MANY STANDARD DRINKS CONTAINING ALCOHOL DO YOU HAVE ON A TYPICAL DAY: PATIENT DOES NOT DRINK

## 2025-06-18 ASSESSMENT — PAIN DESCRIPTION - PAIN TYPE: TYPE: ACUTE PAIN

## 2025-06-18 ASSESSMENT — PAIN DESCRIPTION - ORIENTATION: ORIENTATION: LEFT

## 2025-06-18 ASSESSMENT — PAIN DESCRIPTION - DESCRIPTORS: DESCRIPTORS: ACHING

## 2025-06-18 NOTE — ED PROVIDER NOTES
Monocytes % 7.8 5.0 - 13.0 %    Eosinophils % 1.2 0.0 - 7.0 %    Basophils % 0.5 0.0 - 1.0 %    Immature Granulocytes % 0.2 0.0 - 0.5 %    Neutrophils Absolute 1.63 (L) 1.80 - 8.00 K/UL    Lymphocytes Absolute 2.21 0.80 - 3.50 K/UL    Monocytes Absolute 0.33 0.00 - 1.00 K/UL    Eosinophils Absolute 0.05 0.00 - 0.40 K/UL    Basophils Absolute 0.02 0.00 - 0.10 K/UL    Immature Granulocytes Absolute 0.01 0.00 - 0.04 K/UL    Differential Type AUTOMATED     Comprehensive Metabolic Panel    Collection Time: 06/18/25 10:51 AM   Result Value Ref Range    Sodium 139 136 - 145 mmol/L    Potassium 4.0 3.5 - 5.1 mmol/L    Chloride 109 (H) 97 - 108 mmol/L    CO2 29 21 - 32 mmol/L    Anion Gap 1 (L) 2 - 12 mmol/L    Glucose 109 (H) 65 - 100 mg/dL    BUN 7 6 - 20 MG/DL    Creatinine 0.73 0.55 - 1.02 MG/DL    BUN/Creatinine Ratio 10 (L) 12 - 20      Est, Glom Filt Rate >90 >60 ml/min/1.73m2    Calcium 10.2 (H) 8.5 - 10.1 MG/DL    Total Bilirubin 0.6 0.2 - 1.0 MG/DL    ALT 35 12 - 78 U/L    AST 21 15 - 37 U/L    Alk Phosphatase 107 45 - 117 U/L    Total Protein 7.7 6.4 - 8.2 g/dL    Albumin 3.7 3.5 - 5.0 g/dL    Globulin 4.0 2.0 - 4.0 g/dL    Albumin/Globulin Ratio 0.9 (L) 1.1 - 2.2     Troponin    Collection Time: 06/18/25 10:51 AM   Result Value Ref Range    Troponin, High Sensitivity 5 0 - 51 ng/L       EKG: If performed, independent interpretation documented below in the MDM section  All EKGs independently interpreted by me.    RADIOLOGY:  Non-plain film images such as CT, Ultrasound and MRI are read by the radiologist. Plain radiographic images are visualized and preliminarily interpreted by the ED Provider with the findings documented in the MDM section.     Interpretation per the Radiologist below, if available at the time of this note:     XR SHOULDER LEFT (MIN 2 VIEWS)   Final Result   No acute abnormality.      Electronically signed by Amari Hankins MD      XR CERVICAL SPINE (4-5 VIEWS)   Final Result   1.  No acute bony

## (undated) DEVICE — ENDOSCOPIC KIT COMPLIANCE ENDOKIT

## (undated) DEVICE — IV START KIT: Brand: MEDLINE

## (undated) DEVICE — CUFF BLD PRSS AD CLTH SGL TB W/ BAYNT CONN ROUNDED CORNER

## (undated) DEVICE — SET GRAV CK VLV NEEDLESS ST 3 GANGED 4WAY STPCOCK HI FLO 10

## (undated) DEVICE — TIP SUCT TRNSPAR RIB SURF STD BLB RIG NVENT W/ 5IN1 CONN DYND50138] MEDLINE INDUSTRIES INC]

## (undated) DEVICE — TRAP ENDOSCP POLYP 2 CHMBR DRAWER TYP